# Patient Record
Sex: FEMALE | Race: WHITE
[De-identification: names, ages, dates, MRNs, and addresses within clinical notes are randomized per-mention and may not be internally consistent; named-entity substitution may affect disease eponyms.]

---

## 2017-01-02 ENCOUNTER — HOSPITAL ENCOUNTER (EMERGENCY)
Dept: HOSPITAL 74 - JER | Age: 77
Discharge: HOME | End: 2017-01-02
Payer: COMMERCIAL

## 2017-01-02 VITALS — HEART RATE: 81 BPM | TEMPERATURE: 98.3 F | SYSTOLIC BLOOD PRESSURE: 149 MMHG | DIASTOLIC BLOOD PRESSURE: 75 MMHG

## 2017-01-02 VITALS — BODY MASS INDEX: 23 KG/M2

## 2017-01-02 DIAGNOSIS — I10: ICD-10-CM

## 2017-01-02 DIAGNOSIS — Z76.0: ICD-10-CM

## 2017-01-02 DIAGNOSIS — E03.9: ICD-10-CM

## 2017-01-02 DIAGNOSIS — E78.00: ICD-10-CM

## 2017-01-02 DIAGNOSIS — Z79.4: ICD-10-CM

## 2017-01-02 DIAGNOSIS — E11.65: Primary | ICD-10-CM

## 2017-01-02 LAB
ACETONE SERPL-MCNC: NEGATIVE MG/DL
ALBUMIN SERPL-MCNC: 3.6 G/DL (ref 3.4–5)
ALP SERPL-CCNC: 109 U/L (ref 45–117)
ALT SERPL-CCNC: 31 U/L (ref 12–78)
ANION GAP SERPL CALC-SCNC: 11 MMOL/L (ref 8–16)
APPEARANCE UR: (no result)
AST SERPL-CCNC: 21 U/L (ref 15–37)
BASOPHILS # BLD: 0.7 % (ref 0–2)
BILIRUB SERPL-MCNC: 0.4 MG/DL (ref 0.2–1)
BILIRUB UR STRIP.AUTO-MCNC: NEGATIVE MG/DL
CALCIUM SERPL-MCNC: 9.2 MG/DL (ref 8.5–10.1)
CO2 SERPL-SCNC: 25 MMOL/L (ref 21–32)
COLOR UR: (no result)
CREAT SERPL-MCNC: 0.8 MG/DL (ref 0.55–1.02)
DEPRECATED RDW RBC AUTO: 13 % (ref 11.6–15.6)
EOSINOPHIL # BLD: 0.1 % (ref 0–4.5)
GLUCOSE SERPL-MCNC: 100 MG/DL (ref 74–106)
KETONES UR QL STRIP: NEGATIVE
LEUKOCYTE ESTERASE UR QL STRIP.AUTO: NEGATIVE
MCH RBC QN AUTO: 31 PG (ref 25.7–33.7)
MCHC RBC AUTO-ENTMCNC: 33.3 G/DL (ref 32–36)
MCV RBC: 93 FL (ref 80–96)
NEUTROPHILS # BLD: 83.4 % (ref 42.8–82.8)
NITRITE UR QL STRIP: NEGATIVE
PH UR: 6 [PH] (ref 5–8)
PLATELET # BLD AUTO: 221 K/MM3 (ref 134–434)
PMV BLD: 8.8 FL (ref 7.5–11.1)
PROT SERPL-MCNC: 7.1 G/DL (ref 6.4–8.2)
PROT UR QL STRIP: NEGATIVE
PROT UR QL STRIP: NEGATIVE
RBC # UR STRIP: NEGATIVE /UL
SP GR UR: 1.01 (ref 1–1.03)
UROBILINOGEN UR STRIP-MCNC: NEGATIVE E.U./DL (ref 0.2–1)
WBC # BLD AUTO: 12 K/MM3 (ref 4–10)

## 2017-01-02 NOTE — PDOC
History of Present Illness





<Ro Vilchis - Last Filed: 01/02/17 15:32>





- General


History Source: Patient


Exam Limitations: No Limitations





- History of Present Illness


Initial Comments: 


01/02/17 10:43


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 

significant past medical history of HTN, HLD and diabetes, who presents to the 

emergency department with lightheadedness and elevated blood sugar. She notes 

that she ran out of her Levemir medication and attempted to get in contact with 

her PMD, who are closed due to the holidays. She states that her blood sugar 

levels have been up and down prior to running out of her Levemir medication. 





The patient denies chest pain, shortness of breath, headache and dizziness. 

Denies fever, chills, nausea, vomit, diarrhea and constipation. Denies dysuria, 

frequency, urgency and hematuria. 





Allergies: None 


Past surgical history: L. Foot, L. 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 

1995


Social history: No Alcohol, tobacco or drug use reported 








<Reyes Dubose - Last Filed: 01/02/17 15:50>





- General


Chief Complaint: Lightheaded


Stated Complaint: LIGHTHEADED, HIGH SUGAR


Time Seen by Provider: 01/02/17 09:38





Past History





- Past Medical History


Anemia: No


Asthma: No


Cancer: No


Cardiac Disorders: No


CVA: No


COPD: No


CHF: No


Dementia: No


Diabetes: Yes


GI Disorders: Yes (BLOOD IN STOOL)


 Disorders: No


HTN: Yes


Hypercholesterolemia: Yes


Liver Disease: No


Suicide Attempt (Hx): No


Seizures: No


Thyroid Disease: Yes





- Surgical History


Abdominal Surgery: No


Appendectomy: No


Cardiac Surgery: No


Cholecystectomy: No


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


Orthopedic Surgery: Yes (L. Foot, L. Knee replaced)





- Psycho/Social/Smoking Cessation Hx


Anxiety: No


Suicidal Ideation: No


Smoking Status: No


Smoking History: Never smoked


Have you smoked in the past 12 months: No


Number of Cigarettes Smoked Daily: 0


Information on smoking cessation initiated: No


Hx Alcohol Use: No


Drug/Substance Use Hx: No


Substance Use Type: None


Hx Substance Use Treatment: No





<Ro Vilchis - Last Filed: 01/02/17 15:32>





<Reyes Dubose - Last Filed: 01/02/17 15:50>





- Past Medical History


Allergies/Adverse Reactions: 


 Allergies











Allergy/AdvReac Type Severity Reaction Status Date / Time


 


No Known Allergies Allergy   Verified 01/02/17 09:31











Home Medications: 


Ambulatory Orders





Metformin HCl [Glucophage] 1,000 mg PO DAILY 06/30/13 


Pravastatin Sodium [Pravachol -] 80 mg PO HS 06/30/13 


Aspirin [ASA -] 81 mg PO DAILY #0 tab.chew 07/05/13 


Levothyroxine [Synthroid -] 112 mcg PO DAILY@0700 #0 tablet 07/05/13 


Multivitamins [Multivit ([facility] Formul[facility])] 1 udtab PO DAILY #0 tab 07/05/13 


Glimepiride [Amaryl] 2 mg PO BID 09/17/13 


Insulin Aspart [Novolog] 6 unit SQ BID 09/17/13 


Insulin Detemir [Levemir] 6 unit SQ HS 09/17/13 


Losartan Potassium [Cozaar -] 100 mg PO DAILY 09/17/13 


Ranitidine [Zantac -] 150 mg PO BID #120 tablet 09/18/13 


Amlodipine Besylate [Norvasc -] 5 mg PO DAILY 06/13/15 


Sodium Chloride 5% Ophth Soln [Scotty-128 (Nf)] 1 drop OU DAILY 06/13/15 


Insulin Detemir [Levemir Flextouch] 16 unit SQ DAILY #1 ml 01/02/17 











**Review of Systems





- Review of Systems


Able to Perform ROS?: Yes


Comments:: 





01/02/17 10:42


GENERAL/CONSTITUTIONAL: No fever or chills. No weakness.


HEAD, EYES, EARS, NOSE AND THROAT: No change in vision. No ear pain or 

discharge. No sore throat.


CARDIOVASCULAR: +Lightheadedness. No chest pain or shortness of breath


RESPIRATORY: No cough, wheezing, or hemoptysis.


GASTROINTESTINAL: No nausea, vomiting, diarrhea or constipation.


GENITOURINARY: No dysuria, frequency, or change in urination.


MUSCULOSKELETAL: No joint or muscle swelling or pain. No neck or back pain.


SKIN: No rash


NEUROLOGIC: No headache, vertigo, loss of consciousness, or change in strength/

sensation.


ENDOCRINE: No increased thirst. No abnormal weight change


HEMATOLOGIC/LYMPHATIC: No anemia, easy bleeding, or history of blood clots.


ALLERGIC/IMMUNOLOGIC: No hives or skin allergy.





<ConchaCourtney austinadolph Chahal - Last Filed: 01/02/17 15:50>





*Physical Exam





- Vital Signs


 Last Vital Signs











Temp Pulse Resp BP Pulse Ox


 


 98.3 F   81   18   149/75   100 


 


 01/02/17 09:27  01/02/17 09:27  01/02/17 09:27  01/02/17 09:27  01/02/17 09:27














<Ro Vilchis - Last Filed: 01/02/17 15:32>





- Vital Signs


 Last Vital Signs











Temp Pulse Resp BP Pulse Ox


 


 98.3 F   81   18   149/75   100 


 


 01/02/17 09:27  01/02/17 09:27  01/02/17 09:27  01/02/17 09:27  01/02/17 09:27














- Physical Exam


Comments: 





01/02/17 10:42


GENERAL: Awake, alert, and fully oriented, in no acute distress


HEAD: No signs of trauma, normocephalic, atraumatic 


EYES: PERRLA, EOMI, sclera anicteric, conjunctiva clear


ENT: Auricles normal inspection, hearing grossly normal, nares patent, 

oropharynx clear without


exudates. Moist mucosa


NECK: Normal ROM, supple, no lymphadenopathy, JVD, or masses


LUNGS: No distress, speaks full sentences, clear to auscultation bilaterally 


HEART: Regular rate and rhythm, normal S1 and S2, no murmurs, rubs or gallops, 

peripheral pulses normal and equal bilaterally. 


ABDOMEN: Soft, nontender, normoactive bowel sounds.  No guarding, no rebound.  

No masses


EXTREMITIES: Normal inspection, Normal range of motion, no edema.  No clubbing 

or cyanosis. 


NEUROLOGICAL: Cranial nerves II through XII grossly intact.  Normal speech, 

normal gait, no focal sensorimotor deficits 


SKIN: Warm, Dry, normal turgor, no rashes or lesions noted. 








<HarpalgladysReyes - Last Filed: 01/02/17 15:50>





ED Treatment Course





- LABORATORY


CBC & Chemistry Diagram: 


 01/02/17 10:14





 01/02/17 10:14





<Ro Vilchis - Last Filed: 01/02/17 15:32>





- LABORATORY


CBC & Chemistry Diagram: 


 01/02/17 10:14





 01/02/17 10:14





- ADDITIONAL ORDERS


Additional order review: 


 Laboratory  Results











  01/02/17





  10:14


 


Acetone, Qual  Negative L








 











  01/02/17





  10:14


 


RBC  4.37


 


MCV  93.0


 


MCHC  33.3


 


RDW  13.0  D


 


MPV  8.8


 


Neutrophils %  83.4 H


 


Lymphocytes %  8.2  D


 


Monocytes %  7.6


 


Eosinophils %  0.1  D


 


Basophils %  0.7














<Reyes Dubose - Last Filed: 01/02/17 15:50>





Medical Decision Making





- Medical Decision Making


Case d/w Dr. Bagley, patient's endocrinologist- her noon blood sugar values 

have been elevated in 200-300 range. Currently 100 on CMP. I will refill her 

levemir. He requests that she follow up tomorrow or Wednesday in his office to 

reevaluate her medication. Pt is amenable to this plan. 








<Ro Vilchis - Last Filed: 01/02/17 15:32>





*DC/Admit/Observation/Transfer





- Discharge Dispostion


Admit: No





<Ro Vilchis - Last Filed: 01/02/17 15:32>





- Attestations


Scribe Attestion: 





01/02/17 10:43


Documentation prepared by Reyes Dubose, acting as medical scribe for 

Ro Vilchis MD.





<Reyes Dubose - Last Filed: 01/02/17 15:50>


Diagnosis at time of Disposition: 


 Medication refill, Hyperglycemia





- Discharge Dispostion


Disposition: HOME


Condition at time of disposition: Stable





- Prescriptions


Prescriptions: 


Insulin Detemir [Levemir Flextouch] 16 unit SQ DAILY #1 ml

## 2018-11-02 ENCOUNTER — HOSPITAL ENCOUNTER (INPATIENT)
Dept: HOSPITAL 74 - FER | Age: 78
LOS: 7 days | Discharge: HOME | DRG: 393 | End: 2018-11-09
Attending: NURSE PRACTITIONER | Admitting: HOSPITALIST
Payer: COMMERCIAL

## 2018-11-02 VITALS — BODY MASS INDEX: 25 KG/M2

## 2018-11-02 DIAGNOSIS — E78.5: ICD-10-CM

## 2018-11-02 DIAGNOSIS — I88.0: Primary | ICD-10-CM

## 2018-11-02 DIAGNOSIS — E87.1: ICD-10-CM

## 2018-11-02 DIAGNOSIS — K65.1: ICD-10-CM

## 2018-11-02 DIAGNOSIS — N83.201: ICD-10-CM

## 2018-11-02 DIAGNOSIS — E03.9: ICD-10-CM

## 2018-11-02 DIAGNOSIS — I10: ICD-10-CM

## 2018-11-02 DIAGNOSIS — Z79.4: ICD-10-CM

## 2018-11-02 DIAGNOSIS — Z79.84: ICD-10-CM

## 2018-11-02 DIAGNOSIS — K29.70: ICD-10-CM

## 2018-11-02 DIAGNOSIS — R19.7: ICD-10-CM

## 2018-11-02 DIAGNOSIS — K86.89: ICD-10-CM

## 2018-11-02 DIAGNOSIS — B96.29: ICD-10-CM

## 2018-11-02 DIAGNOSIS — K57.30: ICD-10-CM

## 2018-11-02 DIAGNOSIS — E11.9: ICD-10-CM

## 2018-11-02 LAB
ALBUMIN SERPL-MCNC: 2.8 G/DL (ref 3.5–5)
ALP SERPL-CCNC: 88 U/L (ref 32–92)
ALT SERPL-CCNC: 20 U/L (ref 10–40)
ANION GAP SERPL CALC-SCNC: 7 MMOL/L (ref 8–16)
AST SERPL-CCNC: 24 U/L (ref 10–42)
BACTERIA #/AREA URNS HPF: (no result) /HPF
BASOPHILS # BLD: 1.5 % (ref 0–2)
BILIRUB SERPL-MCNC: 0.5 MG/DL (ref 0.2–1)
BUN SERPL-MCNC: 13 MG/DL (ref 7–18)
CALCIUM SERPL-MCNC: 8.5 MG/DL (ref 8.4–10.2)
CHLORIDE SERPL-SCNC: 95 MMOL/L (ref 98–107)
CO2 SERPL-SCNC: 25 MMOL/L (ref 22–28)
CREAT SERPL-MCNC: 0.8 MG/DL (ref 0.6–1.3)
DEPRECATED RDW RBC AUTO: 11.5 % (ref 11.6–15.6)
EOSINOPHIL # BLD: 0.9 % (ref 0–4.5)
GLUCOSE SERPL-MCNC: 226 MG/DL (ref 74–106)
HCT VFR BLD CALC: 36.2 % (ref 32.4–45.2)
HGB BLD-MCNC: 12.1 GM/DL (ref 10.7–15.3)
LYMPHOCYTES # BLD: 7.5 % (ref 8–40)
MCH RBC QN AUTO: 30.9 PG (ref 25.7–33.7)
MCHC RBC AUTO-ENTMCNC: 33.5 G/DL (ref 32–36)
MCV RBC: 92.4 FL (ref 80–96)
MONOCYTES # BLD AUTO: 9.5 % (ref 3.8–10.2)
NEUTROPHILS # BLD: 80.6 % (ref 42.8–82.8)
PH UR: 6.5 [PH] (ref 4.5–8)
PLATELET # BLD AUTO: 238 K/MM3 (ref 134–434)
PMV BLD: 8.9 FL (ref 7.5–11.1)
POTASSIUM SERPLBLD-SCNC: 4.4 MMOL/L (ref 3.5–5.1)
PROT SERPL-MCNC: 5.6 G/DL (ref 6.4–8.3)
PROT UR QL STRIP: (no result)
PROT UR QL STRIP: (no result)
RBC # BLD AUTO: (no result) /HPF (ref 0–3)
RBC # BLD AUTO: 3.91 M/MM3 (ref 3.6–5.2)
SODIUM SERPL-SCNC: 127 MMOL/L (ref 136–145)
SP GR UR: 1.01 (ref 1.01–1.03)
UROBILINOGEN UR STRIP-MCNC: 0.2 MG/DL (ref 0.2–1)
WBC # BLD AUTO: 12.5 K/MM3 (ref 4–10.8)
WBC # UR AUTO: (no result) /UL (ref 0–5)

## 2018-11-02 NOTE — PDOC
History of Present Illness





- General


Chief Complaint: Pain


Stated Complaint: SENT BY PMD FOR EVALUATION ABDOMINAL PAIN


Time Seen by Provider: 11/02/18 15:56


History Source: Patient


Exam Limitations: No Limitations





- History of Present Illness


Initial Comments: 





11/02/18 16:33


76 yo F HTN HLD hypothyroid and DM here wtih c/o left lower quadrant abd pain. n

/v x 2 7 days ago, and  loose stool.   diffuse lower abd pain. no mod factors.  

has been eating and drinking less than usual. no urinary complaints. no f/c no h

/o diverticulitis. saw dr moreno, sent to ed for ct a/p





Past History





- Past Medical History


Allergies/Adverse Reactions: 


 Allergies











Allergy/AdvReac Type Severity Reaction Status Date / Time


 


No Known Allergies Allergy   Verified 11/02/18 15:33











Home Medications: 


Ambulatory Orders





Pravastatin Sodium [Pravachol -] 80 mg PO HS 06/30/13 


metFORMIN HCL [Glucophage] 1,000 mg PO DAILY 06/30/13 


Aspirin [ASA -] 81 mg PO DAILY #0 tab.chew 07/05/13 


Levothyroxine [Synthroid -] 112 mcg PO DAILY@0700 #0 tablet 07/05/13 


Losartan Potassium [Cozaar -] 100 mg PO DAILY 09/17/13 


Amlodipine Besylate [Norvasc -] 5 mg PO DAILY 06/13/15 


Insulin Detemir [Levemir Flextouch] 16 unit SQ DAILY #1 ml 01/02/17 


Insulin Lispro [Humalog] 5 unit SQ TID 11/02/18 


Metformin HCl [Metformin HCl ER] 500 mg PO DAILY 11/02/18 


Propylene Glycol/Peg 400/Pf [Systane 0.3-0.4% Eye Drop] 1 each OU TID 11/04/18 


Sitagliptin Phosphate [Januvia] 100 mg PO DAILY 11/04/18 


Amoxicillin/Potassium Clav [Augmentin 875-125 Tablet] 1 each PO BID #20 tablet 

11/09/18 


Docusate Sodium [Colace -] 100 mg PO TID #90 capsule 11/09/18 


Lactobacillus Acidophilus [Bacid -] 1 tab PO DAILY #30 tab 11/09/18 


Melatonin 5 mg PO HS PRN #30 tab 11/09/18 


Polyethylene Glycol 3350 [Miralax 119 gm Btl -] 17 gm PO BID #1 bottle 11/09/18 








Anemia: No


Asthma: No


Cancer: No


Cardiac Disorders: No


CVA: No


COPD: No


CHF: No


Dementia: No


Diabetes: Yes


GI Disorders: Yes (BLOOD IN STOOL)


 Disorders: No


HTN: Yes


Hypercholesterolemia: Yes


Liver Disease: No


Seizures: No


Thyroid Disease: Yes





- Surgical History


Abdominal Surgery: No


Appendectomy: No


Cardiac Surgery: No


Cholecystectomy: No


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


Orthopedic Surgery: Yes (L. Foot, L. Knee replaced)





- Suicide/Smoking/Psychosocial Hx


Smoking Status: No


Smoking History: Never smoked


Have you smoked in the past 12 months: No


Number of Cigarettes Smoked Daily: 0


Information on smoking cessation initiated: No


Hx Alcohol Use: No


Drug/Substance Use Hx: No


Substance Use Type: None


Hx Substance Use Treatment: No





**Review of Systems





- Review of Systems


Constitutional: No: Chills, Diaphoresis, Fever


HEENTM: No: Blurred Vision


Respiratory: No: Cough, Orthopnea


Cardiac (ROS): No: Chest Pain, Edema


ABD/GI: Yes: Constipated, Diarrhea


: No: Burning, Dysuria, Discharge


Musculoskeletal: No: Back Pain


All Other Systems: Reviewed and Negative





*Physical Exam





- Vital Signs


 Last Vital Signs











Temp Pulse Resp BP Pulse Ox


 


 98.1 F   76   16   149/78   100 


 


 11/02/18 15:32  11/02/18 15:32  11/02/18 15:32  11/02/18 15:32  11/02/18 15:32














- Physical Exam


Comments: 





11/02/18 16:35


awake alert lungs clear bilaterally  heart rr  no mrg abd soft llq ttp. no 

rebound no guarding. ext wwp. no edema. 





ED Treatment Course





- LABORATORY


CBC & Chemistry Diagram: 


 11/08/18 07:37





 11/08/18 07:37





Medical Decision Making





- Medical Decision Making





11/02/18 16:38


differential diverticulitis, uti, renal, colitis. plan ct a/p labs ua 





*DC/Admit/Observation/Transfer


Diagnosis at time of Disposition: 


 Small bowel edema





Diarrhea


Qualifiers:


 Diarrhea type: unspecified type Qualified Code(s): R19.7 - Diarrhea, 

unspecified








- Discharge Dispostion


Condition at time of disposition: Stable





- Prescriptions





- Referrals





- Patient Instructions





- Post Discharge Activity

## 2018-11-02 NOTE — PDOC
History of Present Illness





- General


Chief Complaint: Pain


Stated Complaint: SENT BY PMD FOR EVALUATION ABDOMINAL PAIN


Time Seen by Provider: 11/02/18 15:56





Past History





- Past Medical History


Allergies/Adverse Reactions: 


 Allergies











Allergy/AdvReac Type Severity Reaction Status Date / Time


 


No Known Allergies Allergy   Verified 11/02/18 15:33











Home Medications: 


Ambulatory Orders





Pravastatin Sodium [Pravachol -] 80 mg PO HS 06/30/13 


metFORMIN HCL [Glucophage] 1,000 mg PO DAILY 06/30/13 


Aspirin [ASA -] 81 mg PO DAILY #0 tab.chew 07/05/13 


Levothyroxine [Synthroid -] 112 mcg PO DAILY@0700 #0 tablet 07/05/13 


Glimepiride [Amaryl] 2 mg PO BID 09/17/13 


Losartan Potassium [Cozaar -] 100 mg PO DAILY 09/17/13 


Amlodipine Besylate [Norvasc -] 5 mg PO DAILY 06/13/15 


Insulin Detemir [Levemir Flextouch] 16 unit SQ DAILY #1 ml 01/02/17 


Insulin Lispro [Humalog] 5 unit SQ TID 11/02/18 


Metformin HCl [Metformin HCl ER] 500 mg PO DAILY 11/02/18 








Anemia: No


Asthma: No


Cancer: No


Cardiac Disorders: No


CVA: No


COPD: No


CHF: No


Dementia: No


Diabetes: Yes


GI Disorders: Yes (BLOOD IN STOOL)


 Disorders: No


HTN: Yes


Hypercholesterolemia: Yes


Liver Disease: No


Seizures: No


Thyroid Disease: Yes





- Surgical History


Abdominal Surgery: No


Appendectomy: No


Cardiac Surgery: No


Cholecystectomy: No


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


Orthopedic Surgery: Yes (L. Foot, L. Knee replaced)





- Suicide/Smoking/Psychosocial Hx


Smoking Status: No


Smoking History: Never smoked


Have you smoked in the past 12 months: No


Number of Cigarettes Smoked Daily: 0


Hx Alcohol Use: No


Drug/Substance Use Hx: No


Substance Use Type: None


Hx Substance Use Treatment: No





ED Treatment Course





- RADIOLOGY


Radiology Studies Ordered: 














 Category Date Time Status


 


 ABDOMEN CT WITH CONTRAST* [CT] Stat CT Scan  11/02/18 15:56 Ordered














*DC/Admit/Observation/Transfer





- Discharge Dispostion


Condition at time of disposition: Stable





- Referrals





- Patient Instructions





- Post Discharge Activity

## 2018-11-02 NOTE — PDOC
*Physical Exam





- Vital Signs


 Last Vital Signs











Temp Pulse Resp BP Pulse Ox


 


 98.1 F   76   16   149/78   100 


 


 11/02/18 15:32  11/02/18 15:32  11/02/18 15:32  11/02/18 15:32  11/02/18 15:32














ED Treatment Course





- LABORATORY


CBC & Chemistry Diagram: 


 11/02/18 16:50





 11/02/18 16:50





- ADDITIONAL ORDERS


Additional order review: 


 Laboratory  Results











  11/02/18 11/02/18 11/02/18





  16:50 16:50 16:50


 


Sodium    127 L


 


Potassium    4.4


 


Chloride    95 L


 


Carbon Dioxide    25


 


Anion Gap    7 L


 


BUN    13


 


Creatinine    0.8


 


Creat Clearance w eGFR    > 60


 


Random Glucose    226 H


 


Lactic Acid  1.2  


 


Calcium    8.5


 


Total Bilirubin    0.5


 


AST    24


 


ALT    20


 


Alkaline Phosphatase    88


 


Total Protein    5.6 L


 


Albumin    2.8 L


 


Lipase   217 


 


Urine Color   


 


Urine Appearance   


 


Urine pH   


 


Ur Specific Gravity   


 


Urine Protein   


 


Urine Glucose (UA)   


 


Urine Ketones   


 


Urine Blood   


 


Urine Nitrite   


 


Urine Bilirubin   


 


Urine Urobilinogen   


 


Ur Leukocyte Esterase   


 


Urine RBC   


 


Urine WBC   


 


Urine Bacteria   














  11/02/18





  16:10


 


Sodium 


 


Potassium 


 


Chloride 


 


Carbon Dioxide 


 


Anion Gap 


 


BUN 


 


Creatinine 


 


Creat Clearance w eGFR 


 


Random Glucose 


 


Lactic Acid 


 


Calcium 


 


Total Bilirubin 


 


AST 


 


ALT 


 


Alkaline Phosphatase 


 


Total Protein 


 


Albumin 


 


Lipase 


 


Urine Color  Yellow


 


Urine Appearance  Clear


 


Urine pH  6.5


 


Ur Specific Gravity  1.015


 


Urine Protein  1+ H


 


Urine Glucose (UA)  1+ H


 


Urine Ketones  Negative


 


Urine Blood  Negative


 


Urine Nitrite  Negative


 


Urine Bilirubin  Negative


 


Urine Urobilinogen  0.2


 


Ur Leukocyte Esterase  Negative


 


Urine RBC  0-2


 


Urine WBC  0-1


 


Urine Bacteria  4+








 











  11/02/18





  16:50


 


RBC  3.91


 


MCV  92.4


 


MCHC  33.5


 


RDW  11.5 L


 


MPV  8.9


 


Neutrophils %  80.6


 


Lymphocytes %  7.5 L


 


Monocytes %  9.5


 


Eosinophils %  0.9


 


Basophils %  1.5














Medical Decision Making





- Medical Decision Making





11/02/18 20:56





Asked to follow-up CAT scan results small amount of bowel edema questionable 

small abscess nonamenable to IR drainage given size





Given abdominal discomfort with diarrhea we'll treat with Cipro Flagyl will 

admit hospital for further management IV fluids have been given.








*DC/Admit/Observation/Transfer


Diagnosis at time of Disposition: 


 Small bowel edema





Diarrhea


Qualifiers:


 Diarrhea type: unspecified type Qualified Code(s): R19.7 - Diarrhea, 

unspecified








- Discharge Dispostion


Condition at time of disposition: Stable


Decision to Admit order: Yes


Decision to Admit order Date/Time: 


Decision to Admit Order











 Category Date Time Status


 


 Decision to Admit to Hospital Routine Admission  11/02/18 20:14 Active














- Referrals





- Patient Instructions





- Post Discharge Activity

## 2018-11-03 LAB
ALBUMIN SERPL-MCNC: 2.4 G/DL (ref 3.4–5)
ALP SERPL-CCNC: 89 U/L (ref 45–117)
ALT SERPL-CCNC: 20 U/L (ref 13–61)
ANION GAP SERPL CALC-SCNC: 13 MMOL/L (ref 8–16)
AST SERPL-CCNC: 13 U/L (ref 15–37)
BASOPHILS # BLD: 0 % (ref 0–2)
BILIRUB SERPL-MCNC: 0.5 MG/DL (ref 0.2–1)
BUN SERPL-MCNC: 9 MG/DL (ref 7–18)
CALCIUM SERPL-MCNC: 7.8 MG/DL (ref 8.5–10.1)
CHLORIDE SERPL-SCNC: 97 MMOL/L (ref 98–107)
CO2 SERPL-SCNC: 22 MMOL/L (ref 21–32)
CREAT SERPL-MCNC: 0.7 MG/DL (ref 0.55–1.3)
DEPRECATED RDW RBC AUTO: 11.6 % (ref 11.6–15.6)
EOSINOPHIL # BLD: 0.7 % (ref 0–4.5)
GLUCOSE SERPL-MCNC: 213 MG/DL (ref 74–106)
HCT VFR BLD CALC: 34.2 % (ref 32.4–45.2)
HGB BLD-MCNC: 11.7 GM/DL (ref 10.7–15.3)
LYMPHOCYTES # BLD: 7.6 % (ref 8–40)
MAGNESIUM SERPL-MCNC: 1.8 MG/DL (ref 1.8–2.4)
MCH RBC QN AUTO: 31.6 PG (ref 25.7–33.7)
MCHC RBC AUTO-ENTMCNC: 34.1 G/DL (ref 32–36)
MCV RBC: 92.6 FL (ref 80–96)
MONOCYTES # BLD AUTO: 9.6 % (ref 3.8–10.2)
NEUTROPHILS # BLD: 82.1 % (ref 42.8–82.8)
PHOSPHATE SERPL-MCNC: 2.6 MG/DL (ref 2.5–4.9)
PLATELET # BLD AUTO: 215 K/MM3 (ref 134–434)
PMV BLD: 8.7 FL (ref 7.5–11.1)
POTASSIUM SERPLBLD-SCNC: 4.7 MMOL/L (ref 3.5–5.1)
PROT SERPL-MCNC: 5.3 G/DL (ref 6.4–8.2)
RBC # BLD AUTO: 3.7 M/MM3 (ref 3.6–5.2)
SODIUM SERPL-SCNC: 132 MMOL/L (ref 136–145)
WBC # BLD AUTO: 9.8 K/MM3 (ref 4–10.8)

## 2018-11-03 RX ADMIN — INSULIN ASPART SCH UNITS: 100 INJECTION, SOLUTION INTRAVENOUS; SUBCUTANEOUS at 22:30

## 2018-11-03 RX ADMIN — PIPERACILLIN SODIUM,TAZOBACTAM SODIUM SCH MLS/HR: 3; .375 INJECTION, POWDER, FOR SOLUTION INTRAVENOUS at 18:13

## 2018-11-03 RX ADMIN — PIPERACILLIN SODIUM,TAZOBACTAM SODIUM SCH MLS/HR: 3; .375 INJECTION, POWDER, FOR SOLUTION INTRAVENOUS at 15:00

## 2018-11-03 RX ADMIN — INSULIN ASPART SCH: 100 INJECTION, SOLUTION INTRAVENOUS; SUBCUTANEOUS at 18:13

## 2018-11-03 RX ADMIN — INSULIN DETEMIR SCH UNITS: 100 INJECTION, SOLUTION SUBCUTANEOUS at 22:30

## 2018-11-03 RX ADMIN — AMLODIPINE BESYLATE SCH MG: 5 TABLET ORAL at 15:10

## 2018-11-03 NOTE — CON.ID
Consult


Consult Specialty:: infectious disease


Referred by:: hospitalist


Reason for Consultation:: possible LLQ abscess





- History of Present Illness


Chief Complaint: LLQ pain for one week


History of Present Illness: 





last friday she had vomiting and LLQ pain, pain has persisted, eating poorly


no fevers


saw her PMD yesterday and had ct scan with question of LLQ 3 cm abscess- 





no history of abdominal surgery


no history of diverticulitis


GI - dr lozano











- History Source


History Provided By: Patient


Limitations to Obtaining History: No Limitations





- Past Medical History


...Pregnant: No


Endocrine: Yes: Diabetes Mellitus, Hypothyroidism





- Past Surgical History


Additional Surgical History: left TKR, Right shoulder replacement.  left foot 

surgery





- Alcohol/Substance Use


Hx Alcohol Use: No





- Smoking History


Smoking history: Never smoked


Have you smoked in the past 12 months: No


Aproximately how many cigarettes per day: 0





- Social History


Usual Living Arrangement: Other


ADL: Independent


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


Place of Birth: [place]


History of Recent Travel: No





Home Medications





- Allergies


Allergies/Adverse Reactions: 


 Allergies











Allergy/AdvReac Type Severity Reaction Status Date / Time


 


No Known Allergies Allergy   Verified 11/02/18 15:33














- Home Medications


Home Medications: 


Ambulatory Orders





Pravastatin Sodium [Pravachol -] 80 mg PO HS 06/30/13 


metFORMIN HCL [Glucophage] 1,000 mg PO DAILY 06/30/13 


Aspirin [ASA -] 81 mg PO DAILY #0 tab.chew 07/05/13 


Levothyroxine [Synthroid -] 112 mcg PO DAILY@0700 #0 tablet 07/05/13 


Glimepiride [Amaryl] 2 mg PO BID 09/17/13 


Losartan Potassium [Cozaar -] 100 mg PO DAILY 09/17/13 


Amlodipine Besylate [Norvasc -] 5 mg PO DAILY 06/13/15 


Insulin Detemir [Levemir Flextouch] 16 unit SQ DAILY #1 ml 01/02/17 


Insulin Lispro [Humalog] 5 unit SQ TID 11/02/18 


Metformin HCl [Metformin HCl ER] 500 mg PO DAILY 11/02/18 











Family Disease History





- Family Disease History


Family History: Denies





Review of Systems





- Review of Systems


Constitutional: reports: Loss of Appetite.  denies: Fever, Lethargy


Eyes: reports: No Symptoms


HENT: reports: No Symptoms


Neck: reports: No Symptoms


Cardiovascular: reports: No Symptoms


Respiratory: reports: No Symptoms


Gastrointestinal: reports: Abdominal Pain, Vomiting


Genitourinary: reports: No Symptoms





Physical Exam


Vital Signs: 


 Vital Signs











Temperature  99.0 F   11/03/18 14:00


 


Pulse Rate  60   11/03/18 14:00


 


Respiratory Rate  17   11/03/18 14:00


 


Blood Pressure  137/61   11/03/18 14:00


 


O2 Sat by Pulse Oximetry (%)  100   11/03/18 14:00











Constitutional: Yes: No Distress, Calm


Eyes: Yes: Conjunctiva Clear


HENT: Yes: Atraumatic, Normocephalic


Neck: Yes: Supple


Cardiovascular: Yes: Regular Rate and Rhythm


Respiratory: Yes: Regular, CTA Bilaterally


Gastrointestinal: Yes: Normal Bowel Sounds, Soft, Tenderness (LLQ)


...Rectal Exam: Yes: Deferred


Extremities: Yes: WNL


Edema: No


Psychiatric: Yes: Alert, Oriented


Labs: 


 CBC, BMP





 11/03/18 07:00 





 11/03/18 07:00 











Imaging





- Results


Cat Scan: Report Reviewed





Problem List





- Problems


(1) Intra-abdominal abscess


Code(s): K65.1 - PERITONEAL ABSCESS   





(2) Diabetes


Code(s): E11.9 - TYPE 2 DIABETES MELLITUS WITHOUT COMPLICATIONS   





Assessment/Plan





continue zosyn


?IR drainage


esr/crp





further reccd to follow





d/w hospitalist

## 2018-11-03 NOTE — HP
CHIEF COMPLAINT: Left lower quadrant pain





PCP: Dr. Blair


GI: Dr. Griffin





HISTORY OF PRESENT ILLNESS:


77 year-old female with a PMH significant for HTN, HLD, Type II diabetes 

insulin dependent, hypothyroidism, gastritis, and sigmoid diverticulosis. A 

week ago patient developed pain in the LLQ, nausea, and vomiting. The nausea 

and vomiting resolved the next day, but the LLQ pain persisted. The pain was 

not better or worse with food, but PO intake dropped due to discomfort. 

Yesterday patient went to see her PCP Dr. Blair who referred patient to the 

ED. Patient denies fever, sweats, chills. Denies dysuria, frequency, urgency. 

Denies diarrhea. No history of abdominal surgeries.





Recent Travel:


No





PAST MEDICAL HISTORY:


Hypertension


Hyperlipidemia


Type II diabetes insulin dependent


Hypothyroidism


Gastritis


Sigmoid diverticulosis





PAST SURGICAL HISTORY:


Brain mass resection (, benign)


Left knee replacement ()


Left foot arch reconstruction ()


Right shoulder replacement ()





Social History:


Smoking: no


Alcohol: no


Drugs: no





Family History: mother  8 days after patient's birth from heart problem; 

father killed in accident, no biological siblings





Allergies


No Known Allergies Allergy (Verified 18 15:33)


 








HOME MEDICATIONS:


 Home Medications











 Medication  Instructions  Recorded


 


Pravastatin Sodium [Pravachol -] 80 mg PO HS 13


 


metFORMIN HCL [Glucophage] 1,000 mg PO DAILY 13


 


Aspirin [ASA -] 81 mg PO DAILY #0 tab.chew 13


 


Levothyroxine [Synthroid -] 112 mcg PO DAILY@0700 #0 tablet 13


 


Glimepiride [Amaryl] 2 mg PO BID 13


 


Losartan Potassium [Cozaar -] 100 mg PO DAILY 13


 


Amlodipine Besylate [Norvasc -] 5 mg PO DAILY 06/13/15


 


Insulin Detemir [Levemir Flextouch] 16 unit SQ DAILY #1 ml 17


 


Insulin Lispro [Humalog] 5 unit SQ TID 18


 


Metformin HCl [Metformin HCl ER] 500 mg PO DAILY 18








REVIEW OF SYSTEMS


CONSTITUTIONAL: 


+loss of appetite


Absent:  fever, chills, diaphoresis, generalized weakness, malaise, weight 

change


HEENT: 


Absent:  rhinorrhea, nasal congestion, throat pain, throat swelling, difficulty 

swallowing, mouth swelling, ear pain, eye pain, visual changes


CARDIOVASCULAR: 


Absent: chest pain, syncope, palpitations, irregular heart rate, lightheadedness

, peripheral edema


RESPIRATORY: 


Absent: cough, shortness of breath, dyspnea with exertion, orthopnea, wheezing, 

stridor, hemoptysis


GASTROINTESTINAL:


+LLQ pain, nausea, vomiting


Absent: abdominal distension, diarrhea, constipation, melena, hematochezia


GENITOURINARY: 


Absent: dysuria, frequency, urgency, hesitancy, hematuria, flank pain, genital 

pain


MUSCULOSKELETAL: 


Absent: myalgia, arthralgia, joint swelling, back pain, neck pain


SKIN: 


Absent: rash, itching, pallor


HEMATOLOGIC/IMMUNOLOGIC: 


Absent: easy bleeding, easy bruising, lymphadenopathy, frequent infections


ENDOCRINE:


Absent: unexplained weight gain, unexplained weight loss, heat intolerance, 

cold intolerance


NEUROLOGIC: 


Absent: headache, focal weakness or paresthesias, dizziness, unsteady gait, 

seizure, mental status changes, bladder or bowel incontinence


PSYCHIATRIC: 


Absent: anxiety, depression, suicidal or homicidal ideation, hallucinations.








PHYSICAL EXAMINATION


 Vital Signs - 24 hr











  18





  15:32 21:00 21:10


 


Temperature 98.1 F  98.6 F


 


Pulse Rate 76  


 


Pulse Rate [   69





Radial]   


 


Respiratory 16 18 16





Rate   


 


Blood Pressure 149/78  


 


Blood Pressure   156/71





[Arm]   


 


O2 Sat by Pulse 100 100 98





Oximetry (%)   














  18





  21:58 06:38


 


Temperature 98.2 F 98.0 F


 


Pulse Rate 67 70


 


Pulse Rate [  





Radial]  


 


Respiratory 18 18





Rate  


 


Blood Pressure 126/55 L 136/51 L


 


Blood Pressure  





[Arm]  


 


O2 Sat by Pulse 100 96





Oximetry (%)  











GENERAL: Awake, alert, and fully oriented, in no acute distress.


HEAD: Normal with no signs of trauma.


EYES: Pupils equal, round and reactive to light, extraocular movements intact, 

sclera anicteric, conjunctiva clear. No lid lag.


EARS, NOSE, THROAT: Ears normal, nares patent, oropharynx clear without 

exudates. Moist mucous membranes.


NECK: Normal range of motion, supple without lymphadenopathy, JVD, or masses.


LUNGS: Breath sounds equal, clear to auscultation bilaterally. No wheezes, and 

no crackles. No accessory muscle use.


HEART: Regular rate and rhythm, S1 and S2 


ABDOMEN: soft, not disended; mild tenderness over LLQ, no guarding, no rebound; 

normoactive bowel sounds


UPPER EXTREMITIES: 2+ pulses, warm, well-perfused. No cyanosis. No clubbing. No 

peripheral edema.


LOWER EXTREMITIES: 2+ pulses, warm, well-perfused. No calf tenderness. No 

peripheral edema. 


NEUROLOGICAL:  Cranial nerves II-XII intact. Normal speech. 


PSYCHIATRIC: Cooperative. Good eye contact. Appropriate mood and affect.


SKIN: Warm, dry, normal turgor


 Laboratory Results - last 24 hr











  18





  16:10 16:50 16:50


 


WBC   12.5 H 


 


RBC   3.91 


 


Hgb   12.1 


 


Hct   36.2 


 


MCV   92.4 


 


MCH   30.9 


 


MCHC   33.5 


 


RDW   11.5 L 


 


Plt Count   238 


 


MPV   8.9 


 


Absolute Neuts (auto)   10.1 


 


Neutrophils %   80.6 


 


Lymphocytes %   7.5 L 


 


Monocytes %   9.5 


 


Eosinophils %   0.9 


 


Basophils %   1.5 


 


Sodium    127 L


 


Potassium    4.4


 


Chloride    95 L


 


Carbon Dioxide    25


 


Anion Gap    7 L


 


BUN    13


 


Creatinine    0.8


 


Creat Clearance w eGFR    > 60


 


Random Glucose    226 H


 


Lactic Acid   


 


Calcium    8.5


 


Total Bilirubin    0.5


 


AST    24


 


ALT    20


 


Alkaline Phosphatase    88


 


Total Protein    5.6 L


 


Albumin    2.8 L


 


Lipase   


 


Urine Color  Yellow  


 


Urine Appearance  Clear  


 


Urine pH  6.5  


 


Ur Specific Gravity  1.015  


 


Urine Protein  1+ H  


 


Urine Glucose (UA)  1+ H  


 


Urine Ketones  Negative  


 


Urine Blood  Negative  


 


Urine Nitrite  Negative  


 


Urine Bilirubin  Negative  


 


Urine Urobilinogen  0.2  


 


Ur Leukocyte Esterase  Negative  


 


Urine RBC  0-2  


 


Urine WBC  0-1  


 


Urine Bacteria  4+  














  18





  16:50 16:50


 


WBC  


 


RBC  


 


Hgb  


 


Hct  


 


MCV  


 


MCH  


 


MCHC  


 


RDW  


 


Plt Count  


 


MPV  


 


Absolute Neuts (auto)  


 


Neutrophils %  


 


Lymphocytes %  


 


Monocytes %  


 


Eosinophils %  


 


Basophils %  


 


Sodium  


 


Potassium  


 


Chloride  


 


Carbon Dioxide  


 


Anion Gap  


 


BUN  


 


Creatinine  


 


Creat Clearance w eGFR  


 


Random Glucose  


 


Lactic Acid   1.2


 


Calcium  


 


Total Bilirubin  


 


AST  


 


ALT  


 


Alkaline Phosphatase  


 


Total Protein  


 


Albumin  


 


Lipase  217 


 


Urine Color  


 


Urine Appearance  


 


Urine pH  


 


Ur Specific Gravity  


 


Urine Protein  


 


Urine Glucose (UA)  


 


Urine Ketones  


 


Urine Blood  


 


Urine Nitrite  


 


Urine Bilirubin  


 


Urine Urobilinogen  


 


Ur Leukocyte Esterase  


 


Urine RBC  


 


Urine WBC  


 


Urine Bacteria  











ASSESSMENT/PLAN:


77 year-old female with a PMH significant for HTN, HLD, Type II diabetes 

insulin dependent, hypothyroidism, gastritis, and sigmoid diverticulosis. 

Admitted for LLQ pain.





LLQ pain


Fluid collection, possible abscess


h/o sigmoid diverticulosis


   --afebrile, no leukocytosis


   -- CTAP w/contrast: focal mesenteric edema within left paramedian aspect 

of lower abdomen/upper pelvis with a 3.3 x 2.3cm ring-enhancing fluid 

collection possible abscess


   --consult for IR for Monday morning to see if collection can be aspirated


   --continue Zosyn and metronidazole


   --follow up esr and crp





Pancreatic duct dilitation


   --minimal to mild dilation of main pancreatic duct, follow up with non-

emergency MRCP





Hypertension


   --BP stable


   --continue amlodipine, losartan





Hyperlipidemia


   --continue pravastatin





Type II diabetes, insulin dependent


   --Levemir 16U qhs


   --Novolog sliding scale coverage





Hypothyroidism


   --TSH elevated, patient states Dr. Blair aware and has been adjusting meds


   --continue home dose levothyroxine 





Gastritis


   --protonix PO





FEN


   Fluids: PO intake adequate


   Electrolytes: replete as indicated


   Nutrition: low sodium, diabetic 





DVT prophylaxis: subq lovenox





Physical therapy





Dispo: continues to require inpatient care. Full code.


























Visit type





- Emergency Visit


Emergency Visit: Yes


ED Registration Date: 18


Care time: The patient presented to the Emergency Department on the above date 

and was hospitalized for further evaluation of their emergent condition.





- New Patient


This patient is new to me today: Yes


Date on this admission: 18





- Critical Care


Critical Care patient: No

## 2018-11-04 LAB
ALBUMIN SERPL-MCNC: 2.4 G/DL (ref 3.5–5)
ALP SERPL-CCNC: 74 U/L (ref 32–92)
ALT SERPL-CCNC: 17 U/L (ref 10–40)
ANION GAP SERPL CALC-SCNC: 10 MMOL/L (ref 8–16)
APTT BLD: 24.8 SECONDS (ref 25.2–36.5)
AST SERPL-CCNC: 17 U/L (ref 10–42)
BASOPHILS # BLD: 0 % (ref 0–2)
BILIRUB SERPL-MCNC: 0.5 MG/DL (ref 0.2–1)
BUN SERPL-MCNC: 11 MG/DL (ref 7–18)
CALCIUM SERPL-MCNC: 8.2 MG/DL (ref 8.4–10.2)
CHLORIDE SERPL-SCNC: 95 MMOL/L (ref 98–107)
CO2 SERPL-SCNC: 27 MMOL/L (ref 22–28)
CREAT SERPL-MCNC: 0.7 MG/DL (ref 0.6–1.3)
DEPRECATED RDW RBC AUTO: 11.3 % (ref 11.6–15.6)
EOSINOPHIL # BLD: 1.9 % (ref 0–4.5)
GLUCOSE SERPL-MCNC: 42 MG/DL (ref 74–106)
HCT VFR BLD CALC: 33.4 % (ref 32.4–45.2)
HGB BLD-MCNC: 11.7 GM/DL (ref 10.7–15.3)
INR BLD: 1.08 (ref 0.82–1.09)
LIPASE SERPL-CCNC: 145 U/L (ref 73–393)
LYMPHOCYTES # BLD: 10.2 % (ref 8–40)
MAGNESIUM SERPL-MCNC: 1.9 MG/DL (ref 1.8–2.4)
MCH RBC QN AUTO: 32.3 PG (ref 25.7–33.7)
MCHC RBC AUTO-ENTMCNC: 35 G/DL (ref 32–36)
MCV RBC: 92.4 FL (ref 80–96)
MONOCYTES # BLD AUTO: 11.6 % (ref 3.8–10.2)
NEUTROPHILS # BLD: 76.3 % (ref 42.8–82.8)
PLATELET # BLD AUTO: 236 K/MM3 (ref 134–434)
PMV BLD: 8.4 FL (ref 7.5–11.1)
POTASSIUM SERPLBLD-SCNC: 4 MMOL/L (ref 3.5–5.1)
PROT SERPL-MCNC: 4.8 G/DL (ref 6.4–8.3)
PT PNL PPP: 12.1 SEC (ref 10.2–13)
RBC # BLD AUTO: 3.61 M/MM3 (ref 3.6–5.2)
SODIUM SERPL-SCNC: 132 MMOL/L (ref 136–145)
WBC # BLD AUTO: 9.8 K/MM3 (ref 4–10.8)

## 2018-11-04 RX ADMIN — INSULIN ASPART SCH UNITS: 100 INJECTION, SOLUTION INTRAVENOUS; SUBCUTANEOUS at 21:31

## 2018-11-04 RX ADMIN — INSULIN ASPART SCH: 100 INJECTION, SOLUTION INTRAVENOUS; SUBCUTANEOUS at 06:37

## 2018-11-04 RX ADMIN — INSULIN ASPART SCH UNITS: 100 INJECTION, SOLUTION INTRAVENOUS; SUBCUTANEOUS at 11:50

## 2018-11-04 RX ADMIN — AMLODIPINE BESYLATE SCH MG: 5 TABLET ORAL at 09:50

## 2018-11-04 RX ADMIN — ENOXAPARIN SODIUM SCH MG: 40 INJECTION SUBCUTANEOUS at 09:51

## 2018-11-04 RX ADMIN — INSULIN DETEMIR SCH UNITS: 100 INJECTION, SOLUTION SUBCUTANEOUS at 21:31

## 2018-11-04 RX ADMIN — INSULIN ASPART SCH UNITS: 100 INJECTION, SOLUTION INTRAVENOUS; SUBCUTANEOUS at 17:21

## 2018-11-04 RX ADMIN — PIPERACILLIN SODIUM,TAZOBACTAM SODIUM SCH MLS/HR: 3; .375 INJECTION, POWDER, FOR SOLUTION INTRAVENOUS at 17:21

## 2018-11-04 RX ADMIN — ASPIRIN 81 MG SCH MG: 81 TABLET ORAL at 09:50

## 2018-11-04 RX ADMIN — PIPERACILLIN SODIUM,TAZOBACTAM SODIUM SCH MLS/HR: 3; .375 INJECTION, POWDER, FOR SOLUTION INTRAVENOUS at 09:50

## 2018-11-04 RX ADMIN — ATORVASTATIN CALCIUM SCH MG: 20 TABLET, FILM COATED ORAL at 21:31

## 2018-11-04 RX ADMIN — PIPERACILLIN SODIUM,TAZOBACTAM SODIUM SCH MLS/HR: 3; .375 INJECTION, POWDER, FOR SOLUTION INTRAVENOUS at 01:06

## 2018-11-04 NOTE — PN
Physical Exam: 


SUBJECTIVE: Patient seen and examined at bedside. Pain is 3/10, intermittent, 

dull. No exacerbating and alleviating factors.








OBJECTIVE:





 Vital Signs











 Period  Temp  Pulse  Resp  BP Sys/Avendano  Pulse Ox


 


 Last 24 Hr  98.7 F-99.1 F  60-68  16-17  111-137/41-61  











GENERAL: Awake, alert, and fully oriented, in no acute distress.


LUNGS: Breath sounds equal, clear to auscultation bilaterally. No wheezes, and 

no crackles. No accessory muscle use.


HEART: Regular rate and rhythm, S1 and S2 


ABDOMEN: soft, not disended; mild tenderness over LLQ, no guarding, no rebound; 

normoactive bowel sounds


UPPER EXTREMITIES: 2+ pulses, warm, well-perfused. No cyanosis. No clubbing. No 

peripheral edema.


LOWER EXTREMITIES: 2+ pulses, warm, well-perfused. No calf tenderness. No 

peripheral edema. 


NEUROLOGICAL:  Cranial nerves II-XII intact. Normal speech. 


PSYCHIATRIC: Cooperative. Good eye contact. Appropriate mood and affect.


SKIN: Warm, dry, normal turgor


 














 Laboratory Results - last 24 hr











  11/03/18 11/03/18 11/04/18





  07:00 21:45 05:35


 


WBC    9.8


 


RBC    3.61


 


Hgb    11.7


 


Hct    33.4


 


MCV    92.4


 


MCH    32.3


 


MCHC    35.0


 


RDW    11.3 L


 


Plt Count    236


 


MPV    8.4


 


Absolute Neuts (auto)    7.5


 


Neutrophils %    76.3


 


Lymphocytes %    10.2


 


Monocytes %    11.6 H


 


Eosinophils %    1.9


 


Basophils %    0.0


 


PT with INR   


 


INR   


 


PTT (Actin FS)   


 


Sodium  132 L  


 


Potassium  4.7  


 


Chloride  97 L  


 


Carbon Dioxide  22  


 


Anion Gap  13  


 


BUN  9  


 


Creatinine  0.7  


 


Creat Clearance w eGFR  > 60  


 


POC Glucometer   298 


 


Random Glucose  213 H  


 


Calcium  7.8 L  


 


Phosphorus  2.6  


 


Magnesium  1.8  


 


Total Bilirubin  0.5  


 


AST  13 L  


 


ALT  20  


 


Alkaline Phosphatase  89  


 


Total Protein  5.3 L  


 


Albumin  2.4 L  


 


Lipase   














  11/04/18 11/04/18 11/04/18





  06:32 07:00 07:00


 


WBC   


 


RBC   


 


Hgb   


 


Hct   


 


MCV   


 


MCH   


 


MCHC   


 


RDW   


 


Plt Count   


 


MPV   


 


Absolute Neuts (auto)   


 


Neutrophils %   


 


Lymphocytes %   


 


Monocytes %   


 


Eosinophils %   


 


Basophils %   


 


PT with INR   12.1 


 


INR   1.08 


 


PTT (Actin FS)   24.8 L 


 


Sodium    132 L


 


Potassium    4.0


 


Chloride    95 L


 


Carbon Dioxide    27


 


Anion Gap    10


 


BUN    11


 


Creatinine    0.7


 


Creat Clearance w eGFR    > 60


 


POC Glucometer  53  


 


Random Glucose    42 L* D


 


Calcium    8.2 L


 


Phosphorus   


 


Magnesium    1.9


 


Total Bilirubin    0.5


 


AST    17  D


 


ALT    17


 


Alkaline Phosphatase    74  D


 


Total Protein    4.8 L


 


Albumin    2.4 L


 


Lipase    145














  11/04/18





  09:40


 


WBC 


 


RBC 


 


Hgb 


 


Hct 


 


MCV 


 


MCH 


 


MCHC 


 


RDW 


 


Plt Count 


 


MPV 


 


Absolute Neuts (auto) 


 


Neutrophils % 


 


Lymphocytes % 


 


Monocytes % 


 


Eosinophils % 


 


Basophils % 


 


PT with INR 


 


INR 


 


PTT (Actin FS) 


 


Sodium 


 


Potassium 


 


Chloride 


 


Carbon Dioxide 


 


Anion Gap 


 


BUN 


 


Creatinine 


 


Creat Clearance w eGFR 


 


POC Glucometer  325


 


Random Glucose 


 


Calcium 


 


Phosphorus 


 


Magnesium 


 


Total Bilirubin 


 


AST 


 


ALT 


 


Alkaline Phosphatase 


 


Total Protein 


 


Albumin 


 


Lipase 








 Current Medications











Generic Name Dose Route Start Last Admin





  Trade Name Freq  PRN Reason Stop Dose Admin


 


Amlodipine Besylate  5 mg  11/03/18 14:45  11/04/18 09:50





  Norvasc -  PO   5 mg





  DAILY MELINA   Administration





     





     





     





     


 


Aspirin  81 mg  11/04/18 10:00  11/04/18 09:50





  Asa -  PO   81 mg





  DAILY MELINA   Administration





     





     





     





     


 


Atorvastatin Calcium  20 mg  11/04/18 22:00  





  Lipitor -  PO   





  HS MELINA   





     





     





     





     


 


Enoxaparin Sodium  40 mg  11/04/18 10:00  11/04/18 09:51





  Lovenox -  SQ   40 mg





  DAILY MELINA   Administration





     





     





     





     


 


Piperacillin Sod/Tazobactam  50 mls @ 100 mls/hr  11/03/18 15:45  11/04/18 17:21





  Sod 3.375 gm/ Dextrose  IVPB   100 mls/hr





  Q8H-IV MELINA   Administration





     





     





  Protocol   





     


 


Insulin Aspart  0 units  11/03/18 16:30  11/04/18 17:21





  Novolog Vial  SQ   2 units





  [facility] MELINA   Administration





     





     





  Protocol   





     


 


Insulin Detemir  16 units  11/03/18 22:00  11/03/18 22:30





  Levemir Vial  SQ   16 units





  HS MELINA   Administration





     





     





     





     


 


Levothyroxine Sodium  112 mcg  11/05/18 07:00  





  Synthroid -  PO   





  DAILY@0700 MELINA   





     





     





     





     


 


Losartan Potassium  100 mg  11/05/18 10:00  





  Cozaar -  PO   





  DAILY MELINA   





     





     





     





     


 


Non-Formulary Medication  1 each  11/04/18 22:00  





  Propylene Glycol/Peg 400/Pf [Systane 0.3-0.4% Eye Drop]  OU   





  TID MELINA   





     





     





     





     











ASSESSMENT/PLAN:


77 year-old female with a PMH significant for HTN, HLD, Type II diabetes 

insulin dependent, hypothyroidism, gastritis, and sigmoid diverticulosis. 

Admitted for LLQ pain.





LLQ pain


Fluid collection, possible abscess


h/o sigmoid diverticulosis


   --afebrile, no leukocytosis


   --11/2 CTAP w/contrast: focal mesenteric edema within left paramedian aspect 

of lower abdomen/upper pelvis with a 3.3 x 2.3cm ring-enhancing fluid 

collection possible abscess


   --consult for IR for Monday morning to see if collection can be aspirated


   --continue Zosyn and metronidazole


   --follow up esr and crp





Pancreatic duct dilitation


   --minimal to mild dilation of main pancreatic duct, follow up with non-

emergency MRCP





Hypertension


   --BP stable


   --continue amlodipine, losartan





Hyperlipidemia


   --continue pravastatin





Type II diabetes, insulin dependent


   --Levemir 16U qhs


   --Novolog sliding scale coverage





Hypothyroidism


   --TSH elevated, patient states Dr. Blair aware and has been adjusting meds


   --continue home dose levothyroxine 





Gastritis


   --protonix PO





FEN


   Fluids: PO intake adequate


   Electrolytes: replete as indicated


   Nutrition: low sodium, diabetic 





DVT prophylaxis: subq lovenox





Physical therapy





Dispo: continues to require inpatient care. Full code.











Visit type





- Emergency Visit


Emergency Visit: Yes


ED Registration Date: 11/02/18


Care time: The patient presented to the Emergency Department on the above date 

and was hospitalized for further evaluation of their emergent condition.





- New Patient


This patient is new to me today: No





- Critical Care


Critical Care patient: No

## 2018-11-05 LAB
ALBUMIN SERPL-MCNC: 2.5 G/DL (ref 3.5–5)
ALP SERPL-CCNC: 74 U/L (ref 32–92)
ALT SERPL-CCNC: 19 U/L (ref 10–40)
ANION GAP SERPL CALC-SCNC: 8 MMOL/L (ref 8–16)
AST SERPL-CCNC: 19 U/L (ref 10–42)
BASOPHILS # BLD: 0.2 % (ref 0–2)
BILIRUB SERPL-MCNC: 0.2 MG/DL (ref 0.2–1)
BUN SERPL-MCNC: 10 MG/DL (ref 7–18)
CALCIUM SERPL-MCNC: 8.5 MG/DL (ref 8.4–10.2)
CHLORIDE SERPL-SCNC: 96 MMOL/L (ref 98–107)
CO2 SERPL-SCNC: 27 MMOL/L (ref 22–28)
CREAT SERPL-MCNC: 0.8 MG/DL (ref 0.6–1.3)
DEPRECATED RDW RBC AUTO: 11.6 % (ref 11.6–15.6)
EOSINOPHIL # BLD: 1.1 % (ref 0–4.5)
GLUCOSE SERPL-MCNC: 240 MG/DL (ref 74–106)
HCT VFR BLD CALC: 35.4 % (ref 32.4–45.2)
HGB BLD-MCNC: 11.8 GM/DL (ref 10.7–15.3)
LYMPHOCYTES # BLD: 9.5 % (ref 8–40)
MAGNESIUM SERPL-MCNC: 1.8 MG/DL (ref 1.8–2.4)
MCH RBC QN AUTO: 31.2 PG (ref 25.7–33.7)
MCHC RBC AUTO-ENTMCNC: 33.5 G/DL (ref 32–36)
MCV RBC: 93.1 FL (ref 80–96)
MONOCYTES # BLD AUTO: 6.3 % (ref 3.8–10.2)
NEUTROPHILS # BLD: 82.9 % (ref 42.8–82.8)
PLATELET # BLD AUTO: 275 K/MM3 (ref 134–434)
PMV BLD: 8.2 FL (ref 7.5–11.1)
POTASSIUM SERPLBLD-SCNC: 4.3 MMOL/L (ref 3.5–5.1)
PROT SERPL-MCNC: 5.3 G/DL (ref 6.4–8.3)
RBC # BLD AUTO: 3.8 M/MM3 (ref 3.6–5.2)
SODIUM SERPL-SCNC: 131 MMOL/L (ref 136–145)
WBC # BLD AUTO: 9.7 K/MM3 (ref 4–10.8)

## 2018-11-05 RX ADMIN — LEVOTHYROXINE SODIUM SCH MCG: 112 TABLET ORAL at 06:49

## 2018-11-05 RX ADMIN — PIPERACILLIN SODIUM,TAZOBACTAM SODIUM SCH MLS/HR: 3; .375 INJECTION, POWDER, FOR SOLUTION INTRAVENOUS at 09:12

## 2018-11-05 RX ADMIN — ASPIRIN 81 MG SCH MG: 81 TABLET ORAL at 09:13

## 2018-11-05 RX ADMIN — ENOXAPARIN SODIUM SCH MG: 40 INJECTION SUBCUTANEOUS at 09:15

## 2018-11-05 RX ADMIN — INSULIN ASPART SCH: 100 INJECTION, SOLUTION INTRAVENOUS; SUBCUTANEOUS at 21:36

## 2018-11-05 RX ADMIN — INSULIN ASPART SCH UNITS: 100 INJECTION, SOLUTION INTRAVENOUS; SUBCUTANEOUS at 16:51

## 2018-11-05 RX ADMIN — INSULIN ASPART SCH UNITS: 100 INJECTION, SOLUTION INTRAVENOUS; SUBCUTANEOUS at 06:50

## 2018-11-05 RX ADMIN — PIPERACILLIN SODIUM,TAZOBACTAM SODIUM SCH MLS/HR: 3; .375 INJECTION, POWDER, FOR SOLUTION INTRAVENOUS at 01:43

## 2018-11-05 RX ADMIN — ATORVASTATIN CALCIUM SCH MG: 20 TABLET, FILM COATED ORAL at 21:36

## 2018-11-05 RX ADMIN — AMLODIPINE BESYLATE SCH MG: 5 TABLET ORAL at 09:13

## 2018-11-05 RX ADMIN — LOSARTAN POTASSIUM SCH MG: 50 TABLET, FILM COATED ORAL at 09:13

## 2018-11-05 RX ADMIN — PANTOPRAZOLE SODIUM SCH MG: 40 TABLET, DELAYED RELEASE ORAL at 09:13

## 2018-11-05 RX ADMIN — PIPERACILLIN SODIUM,TAZOBACTAM SODIUM SCH MLS/HR: 3; .375 INJECTION, POWDER, FOR SOLUTION INTRAVENOUS at 17:01

## 2018-11-05 RX ADMIN — INSULIN ASPART SCH UNITS: 100 INJECTION, SOLUTION INTRAVENOUS; SUBCUTANEOUS at 11:58

## 2018-11-05 NOTE — PN
Progress Note (short form)





- Note


Progress Note: 





Patient seen and consult dictated.


Patient with likely intraabdominal infection - mesenteric inflammation on left 

side (per CT scan) along with ?abscess and c/o abdominal pain. Feels better on 

IV antibiotics with improving WBC and no fever or chills.


Agree with plans per ID


Tolerating PO


No GII intervention at this time

## 2018-11-05 NOTE — CONS
DATE OF CONSULTATION:  11/05/2018

 

Asked to evaluate this 77-year-old female with a possible abscess in the left side of

the abdomen.

 

The patient is a 77-year-old female with a past history of type 2 diabetes mellitus,

hypothyroidism, hypertension, and hypercholesterolemia.  She presented to her primary

care doctor with some abdominal pain and vomiting, with some diminishment in her

appetite.  She was sent for a CAT scan of the abdomen which showed a possible 3-cm

abscess in the left mesentery, left lower quadrant, and the patient was admitted to

the hospital.  At the time of admission, she had an elevated white count of 12.5 with

normal chemistries.  She was seen by Infectious Disease and started empirically on

antibiotics intravenously.  The patient has remained afebrile during the hospital

course and states that her abdominal discomfort has stabilized and improved somewhat.

 She is currently seen sitting in a chair comfortably, eating her lunch.  Currently,

she is afebrile with stable blood pressure and heart rate.  Her blood cultures are

negative to date.  The patient denies any prior similar history in the past.  She has

had colonoscopies previous and believes the last one may have been 2-3 years ago by

Dr. Griffin.  She is not aware of being told of diverticulitis or diverticulosis. 

Currently, she is on antibiotics for the mesenteric edema, possible

abscess/infection.  Her most recent white blood count has improved to 9.7.

 

PHYSICAL EXAMINATION:

General:  The patient is a well-developed, well-nourished female, alert, in no

obvious discomfort.  She does describe some discomfort when she presses deeply in the

left lower quadrant.

Lungs:  Clear.

Cardiac:  Regular rate and rhythm.

Abdomen:  Soft, flat, and with minimal discomfort to deep palpation in the left mid

and lower abdomen but no rebound, guarding, or mass.

 

Patient with probable infection in the mesentery in the left side of the abdomen and

possible small abscess which is apparently not amenable to drainage by Interventional

Radiology.  Patient on Zosyn per Infectious Disease and clinically improving.  Would

continue current regimen per ID, and we will follow clinically as needed.  No

indication for any GI endoscopy or further interventions at the present time.

 

 

HUANG HUERTA M.D.

 

[id number]

DD: 11/05/2018 13:01

DT: 11/05/2018 21:12

Job #:  [id number]

## 2018-11-05 NOTE — PN
Progress Note, Physician


History of Present Illness: 





C/O abdominal discomfort- points to across abdomen


No N/V     Tolerated solid diet for breakfast


Reports small BM     No rectal bleeding


Denies fever/ chills


WBC WNL


BC no growth





- Current Medication List


Current Medications: 


Active Medications





Amlodipine Besylate (Norvasc -)  5 mg PO DAILY [facility]


   Last Admin: 11/05/18 09:13 Dose:  5 mg


Aspirin (Asa -)  81 mg PO DAILY [facility]


   Last Admin: 11/05/18 09:13 Dose:  81 mg


Atorvastatin Calcium (Lipitor -)  20 mg PO HS [facility]


   Last Admin: 11/04/18 21:31 Dose:  20 mg


Enoxaparin Sodium (Lovenox -)  40 mg SQ DAILY [facility]


   Last Admin: 11/05/18 09:15 Dose:  40 mg


Piperacillin Sod/Tazobactam (Sod 3.375 gm/ Dextrose)  50 mls @ 100 mls/hr IVPB 

Q8H-IV [facility]; Protocol


   Last Admin: 11/05/18 09:12 Dose:  100 mls/hr


Insulin Aspart (Novolog Vial)  0 units SQ ACHS [facility]; Protocol


   Last Admin: 11/05/18 06:50 Dose:  6 units


Insulin Detemir (Levemir Vial)  16 units SQ DAILY [facility]


   Last Admin: 11/05/18 09:14 Dose:  16 units


Levothyroxine Sodium (Synthroid -)  112 mcg PO DAILY@0700 [facility]


   Last Admin: 11/05/18 06:49 Dose:  112 mcg


Losartan Potassium (Cozaar -)  100 mg PO DAILY [facility]


   Last Admin: 11/05/18 09:13 Dose:  100 mg


Non-Formulary Medication (Propylene Glycol/Peg 400/Pf [Systane 0.3-0.4% Eye Drop

])  1 each OU TID [facility]


Pantoprazole Sodium (Protonix -)  40 mg PO DAILY [facility]


   Last Admin: 11/05/18 09:13 Dose:  40 mg











- Objective


Vital Signs: 


 Vital Signs











Temperature  98.0 F   11/05/18 09:00


 


Pulse Rate  59 L  11/05/18 09:00


 


Respiratory Rate  17   11/05/18 09:00


 


Blood Pressure  106/59 L  11/05/18 09:00


 


O2 Sat by Pulse Oximetry (%)  98   11/05/18 05:21











Constitutional: Yes: No Distress


Eyes: Yes: Conjunctiva Clear


Cardiovascular: Yes: Regular Rate and Rhythm, S1, S2


Respiratory: Yes: CTA Bilaterally


Gastrointestinal: Yes: Normal Bowel Sounds, Soft.  No: Tenderness


Edema: No


Labs: 


 CBC, BMP





 11/05/18 07:30 





 11/05/18 07:30 





 INR, PTT











INR  1.08  (0.82-1.09)   11/04/18  07:00    














Assessment/Plan





Mesenteric edema, possible abscess   ? etiology


Cultures pending


Continue empiric coverage GI pathogens with zosyn


Advise GI/IR consults

## 2018-11-05 NOTE — PN
Physical Exam: 


SUBJECTIVE: Patient seen and examined, tolerating regular diet, reports minimal 

abdominal pain.  








OBJECTIVE: Patient is a 77 year-old female with a PMH significant for HTN, HLD, 

Type II diabetes insulin dependent, hypothyroidism, gastritis, and sigmoid 

diverticulosis. patient was admitted from the emergency department for 

mesenteric abscess.  





 Vital Signs











 Period  Temp  Pulse  Resp  BP Sys/Avendano  Pulse Ox


 


 Last 24 Hr  97.9 F-99.0 F  54-66  16-18  121-139/52-57  96-98











GENERAL: The patient is awake, alert, and fully oriented, in no acute distress.


HEAD: Normal with no signs of trauma.


EYES: PERRL, extraocular movements intact, sclera anicteric, conjunctiva clear. 

No ptosis. 


ENT: Ears normal, nares patent, oropharynx clear without exudates, moist mucous 


membranes.


NECK: Trachea midline, full range of motion, supple. 


LUNGS: Breath sounds equal, clear to auscultation bilaterally, no wheezes, no 

crackles, no 


accessory muscle use. 


HEART: Regular rate and rhythm, S1, S2 without murmur, rub or gallop.


ABDOMEN: Soft, tenderness upon deep palpation to the Left lower quadrant,  

nondistended, normoactive bowel sounds, no guarding, no 


rebound, no hepatosplenomegaly, no masses.


EXTREMITIES: 2+ pulses, warm, well-perfused, no edema. 


NEUROLOGICAL: Cranial nerves II through XII grossly intact. Normal speech, gait 

not 


observed.


PSYCH: Normal mood, normal affect.


SKIN: Warm, dry, normal turgor, no rashes or lesions noted














 Laboratory Results - last 24 hr








 CBC











WBC  9.7 K/mm3 (4.0-10.8)   11/05/18  07:30    


 


RBC  3.80 M/mm3 (3.60-5.2)   11/05/18  07:30    


 


Hgb  11.8 GM/dl (10.7-15.3)   11/05/18  07:30    


 


Hct  35.4 % (32.4-45.2)   11/05/18  07:30    


 


MCV  93.1 fl (80-96)   11/05/18  07:30    


 


MCH  31.2 pg (25.7-33.7)   11/05/18  07:30    


 


MCHC  33.5 g/dl (32.0-36.0)   11/05/18  07:30    


 


RDW  11.6 % (11.6-15.6)   11/05/18  07:30    


 


Plt Count  275 K/MM3 (134-434)   11/05/18  07:30    


 


MPV  8.2 fl (7.5-11.1)   11/05/18  07:30    


 


Absolute Neuts (auto)  8.1 K/mm3  11/05/18  07:30    


 


Neutrophils %  82.9 % (42.8-82.8)  H  11/05/18  07:30    


 


Lymphocytes %  9.5 % (8-40)   11/05/18  07:30    


 


Monocytes %  6.3 % (3.8-10.2)   11/05/18  07:30    


 


Eosinophils %  1.1 % (0-4.5)   11/05/18  07:30    


 


Basophils %  0.2 % (0-2.0)   11/05/18  07:30    








 CMP











Sodium  131 mmol/L (136-145)  L  11/05/18  07:30    


 


Potassium  4.3 mmol/L (3.5-5.1)   11/05/18  07:30    


 


Chloride  96 mmol/L ()  L  11/05/18  07:30    


 


Carbon Dioxide  27 mmol/L (22-28)   11/05/18  07:30    


 


Anion Gap  8 MMOL/L (8-16)   11/05/18  07:30    


 


BUN  10 mg/dl (7-18)   11/05/18  07:30    


 


Creatinine  0.8 mg/dl (0.6-1.3)   11/05/18  07:30    


 


Creat Clearance w eGFR  > 60  (>60)   11/05/18  07:30    


 


POC Glucometer  168 UNITS ()   11/05/18  11:52    


 


Random Glucose  240 mg/dl ()  H D 11/05/18  07:30    


 


Lactic Acid  1.2 mmol/L (0.4-2.0)   11/02/18  16:50    


 


Calcium  8.5 mg/dl (8.4-10.2)   11/05/18  07:30    


 


Phosphorus  2.6 mg/dL (2.5-4.9)   11/03/18  07:00    


 


Magnesium  1.8 mg/dL (1.8-2.4)   11/05/18  07:30    


 


Total Bilirubin  0.2 mg/dl (0.2-1.0)   11/05/18  07:30    


 


AST  19 U/L (10-42)   11/05/18  07:30    


 


ALT  19 U/L (10-40)   11/05/18  07:30    


 


Alkaline Phosphatase  74 U/L (32-92)   11/05/18  07:30    


 


C-Reactive Protein  6.6 MG/DL (0.00-0.3)  H  11/04/18  07:00    


 


Total Protein  5.3 g/dl (6.4-8.3)  L  11/05/18  07:30    


 


Albumin  2.5 g/dl (3.5-5.0)  L  11/05/18  07:30    


 


Lipase  145 U/L ()   11/04/18  07:00    


 


TSH  8.09 uIU/ml (0.358-3.74)  H  11/03/18  07:00    

















Active Medications











Generic Name Dose Route Start Last Admin





  Trade Name Freq  PRN Reason Stop Dose Admin


 


Amlodipine Besylate  5 mg  11/03/18 14:45  11/04/18 09:50





  Norvasc -  PO   5 mg





  DAILY MELINA   Administration





     





     





     





     


 


Aspirin  81 mg  11/04/18 10:00  11/04/18 09:50





  Asa -  PO   81 mg





  DAILY MELINA   Administration





     





     





     





     


 


Atorvastatin Calcium  20 mg  11/04/18 22:00  11/04/18 21:31





  Lipitor -  PO   20 mg





  HS MELINA   Administration





     





     





     





     


 


Enoxaparin Sodium  40 mg  11/04/18 10:00  11/04/18 09:51





  Lovenox -  SQ   40 mg





  DAILY MELINA   Administration





     





     





     





     


 


Piperacillin Sod/Tazobactam  50 mls @ 100 mls/hr  11/03/18 15:45  11/05/18 01:43





  Sod 3.375 gm/ Dextrose  IVPB   100 mls/hr





  Q8H-IV MELINA   Administration





     





     





  Protocol   





     


 


Insulin Aspart  0 units  11/03/18 16:30  11/05/18 06:50





  Novolog Vial  SQ   6 units





  ACHS MELINA   Administration





     





     





  Protocol   





     


 


Insulin Detemir  16 units  11/03/18 22:00  11/04/18 21:31





  Levemir Vial  SQ   16 units





  HS MELINA   Administration





     





     





     





     


 


Levothyroxine Sodium  112 mcg  11/05/18 07:00  11/05/18 06:49





  Synthroid -  PO   112 mcg





  DAILY@0700 MELINA   Administration





     





     





     





     


 


Losartan Potassium  100 mg  11/05/18 10:00  





  Cozaar -  PO   





  DAILY [facility]   





     





     





     





     


 


Non-Formulary Medication  1 each  11/04/18 22:00  





  Propylene Glycol/Peg 400/Pf [Systane 0.3-0.4% Eye Drop]  OU   





  TID [facility]   





     





     





     





     


 


Pantoprazole Sodium  40 mg  11/05/18 10:00  





  Protonix -  PO   





  DAILY [facility]   





     





     





     





     








 Microbiology





11/02/18 16:10   Urine - Urine Clean Catch   Urine Culture - Final


                            Escherichia Coli


11/03/18 15:05   Blood - Peripheral Venous   Blood Culture - Preliminary


                            NO GROWTH OBTAINED AFTER 24 HOURS, INCUBATION TO 

CONTINUE


                            FOR 4 DAYS.


11/03/18 14:50   Blood - Peripheral Venous   Blood Culture - Preliminary


                            NO GROWTH OBTAINED AFTER 24 HOURS, INCUBATION TO 

CONTINUE


                            FOR 4 DAYS.


IMAGING


CT abdomen/pelvis with contrast: mesenteric edema, 3.3 x 2.3 possibly 

representing an abscess minimal mild dilation of the main pancreatic duct 

within the body





ASSESSMENT/PLAN:





1) GI


Fluid collection, possible abscess


h/o sigmoid diverticulosis


- afebrile, no leukocytosis, discussed with interventional radiologist, Dr Yuan, abscess can not be drained advised to continue iv abx


- will continue zosyn day 3


- appreciate GI input (Feliciano)


Pancreatic duct dilitation


   --minimal to mild dilation of main pancreatic duct, follow up with non-

emergency MRCP





2) cardiovascularu


Hypertension


- b/p at goal, continue amlodipine, losartan


Hyperlipidemia


- continue pravastatin





3) endo


Type II diabetes, insulin dependent


-Levemir 16U in am (as per home med regimen) 


-Novolog sliding scale coverage


Hypothyroidism


- TSH elevated, patient states Dr. Blair aware and has been adjusting meds, 

pending t4


- will continue home dose levothyroxine 





4) 


uti


- urine culture +ecoli, continue zosyn 





FEN


   Fluids: PO intake adequate


   Electrolytes: hyponatremia, improved with iv hydration, correct serum sodium 

today 134


   Nutrition: low sodium, diabetic 





DVT prophylaxis: subq lovenox





Physical therapy





Dispo: continues to require inpatient care. Full code.








Visit type





- Emergency Visit


Emergency Visit: Yes


ED Registration Date: 11/02/18


Care time: The patient presented to the Emergency Department on the above date 

and was hospitalized for further evaluation of their emergent condition.





- New Patient


This patient is new to me today: No





- Critical Care


Critical Care patient: No





- Discharge Referral


Referred to [facility] No

## 2018-11-06 LAB
ALBUMIN SERPL-MCNC: 2.7 G/DL (ref 3.5–5)
ALP SERPL-CCNC: 79 U/L (ref 32–92)
ALT SERPL-CCNC: 18 U/L (ref 10–40)
ANION GAP SERPL CALC-SCNC: 6 MMOL/L (ref 8–16)
AST SERPL-CCNC: 24 U/L (ref 10–42)
BASOPHILS # BLD: 0.4 % (ref 0–2)
BILIRUB SERPL-MCNC: 0.3 MG/DL (ref 0.2–1)
BUN SERPL-MCNC: 10 MG/DL (ref 7–18)
CALCIUM SERPL-MCNC: 8.4 MG/DL (ref 8.4–10.2)
CHLORIDE SERPL-SCNC: 95 MMOL/L (ref 98–107)
CO2 SERPL-SCNC: 27 MMOL/L (ref 22–28)
CREAT SERPL-MCNC: 0.8 MG/DL (ref 0.6–1.3)
DEPRECATED RDW RBC AUTO: 11.6 % (ref 11.6–15.6)
EOSINOPHIL # BLD: 1.9 % (ref 0–4.5)
GLUCOSE SERPL-MCNC: 214 MG/DL (ref 74–106)
HCT VFR BLD CALC: 35.8 % (ref 32.4–45.2)
HGB BLD-MCNC: 12.5 GM/DL (ref 10.7–15.3)
LYMPHOCYTES # BLD: 9.4 % (ref 8–40)
MAGNESIUM SERPL-MCNC: 1.6 MG/DL (ref 1.8–2.4)
MCH RBC QN AUTO: 32 PG (ref 25.7–33.7)
MCHC RBC AUTO-ENTMCNC: 34.8 G/DL (ref 32–36)
MCV RBC: 92 FL (ref 80–96)
MONOCYTES # BLD AUTO: 7.4 % (ref 3.8–10.2)
NEUTROPHILS # BLD: 80.9 % (ref 42.8–82.8)
PHOSPHATE SERPL-MCNC: 2.4 MG/DL (ref 2.5–4.6)
PLATELET # BLD AUTO: 309 K/MM3 (ref 134–434)
PMV BLD: 7.7 FL (ref 7.5–11.1)
POTASSIUM SERPLBLD-SCNC: 4.1 MMOL/L (ref 3.5–5.1)
PROT SERPL-MCNC: 5.6 G/DL (ref 6.4–8.3)
RBC # BLD AUTO: 3.89 M/MM3 (ref 3.6–5.2)
SODIUM SERPL-SCNC: 128 MMOL/L (ref 136–145)
WBC # BLD AUTO: 9.6 K/MM3 (ref 4–10.8)

## 2018-11-06 RX ADMIN — LEVOTHYROXINE SODIUM SCH MCG: 112 TABLET ORAL at 06:37

## 2018-11-06 RX ADMIN — PIPERACILLIN SODIUM,TAZOBACTAM SODIUM SCH MLS/HR: 3; .375 INJECTION, POWDER, FOR SOLUTION INTRAVENOUS at 09:13

## 2018-11-06 RX ADMIN — DOCUSATE SODIUM SCH MG: 100 CAPSULE, LIQUID FILLED ORAL at 06:37

## 2018-11-06 RX ADMIN — AMLODIPINE BESYLATE SCH MG: 5 TABLET ORAL at 09:13

## 2018-11-06 RX ADMIN — PIPERACILLIN SODIUM,TAZOBACTAM SODIUM SCH MLS/HR: 3; .375 INJECTION, POWDER, FOR SOLUTION INTRAVENOUS at 17:13

## 2018-11-06 RX ADMIN — ASPIRIN 81 MG SCH MG: 81 TABLET ORAL at 09:12

## 2018-11-06 RX ADMIN — INSULIN ASPART SCH UNITS: 100 INJECTION, SOLUTION INTRAVENOUS; SUBCUTANEOUS at 16:30

## 2018-11-06 RX ADMIN — POLYVINYL ALCOHOL SCH DROP: 14 SOLUTION/ DROPS OPHTHALMIC at 21:18

## 2018-11-06 RX ADMIN — POLYVINYL ALCOHOL SCH: 14 SOLUTION/ DROPS OPHTHALMIC at 03:08

## 2018-11-06 RX ADMIN — DOCUSATE SODIUM SCH MG: 100 CAPSULE, LIQUID FILLED ORAL at 21:18

## 2018-11-06 RX ADMIN — DOCUSATE SODIUM SCH MG: 100 CAPSULE, LIQUID FILLED ORAL at 14:00

## 2018-11-06 RX ADMIN — ENOXAPARIN SODIUM SCH MG: 40 INJECTION SUBCUTANEOUS at 09:13

## 2018-11-06 RX ADMIN — ATORVASTATIN CALCIUM SCH MG: 20 TABLET, FILM COATED ORAL at 21:18

## 2018-11-06 RX ADMIN — INSULIN ASPART SCH UNITS: 100 INJECTION, SOLUTION INTRAVENOUS; SUBCUTANEOUS at 07:06

## 2018-11-06 RX ADMIN — POLYETHYLENE GLYCOL 3350 SCH GM: 17 POWDER, FOR SOLUTION ORAL at 09:13

## 2018-11-06 RX ADMIN — PIPERACILLIN SODIUM,TAZOBACTAM SODIUM SCH MLS/HR: 3; .375 INJECTION, POWDER, FOR SOLUTION INTRAVENOUS at 01:19

## 2018-11-06 RX ADMIN — POLYVINYL ALCOHOL SCH DROP: 14 SOLUTION/ DROPS OPHTHALMIC at 19:31

## 2018-11-06 RX ADMIN — POLYETHYLENE GLYCOL 3350 SCH GM: 17 POWDER, FOR SOLUTION ORAL at 21:18

## 2018-11-06 RX ADMIN — PANTOPRAZOLE SODIUM SCH MG: 40 TABLET, DELAYED RELEASE ORAL at 09:13

## 2018-11-06 RX ADMIN — INSULIN ASPART SCH UNITS: 100 INJECTION, SOLUTION INTRAVENOUS; SUBCUTANEOUS at 21:18

## 2018-11-06 RX ADMIN — INSULIN ASPART SCH UNITS: 100 INJECTION, SOLUTION INTRAVENOUS; SUBCUTANEOUS at 11:10

## 2018-11-06 RX ADMIN — LOSARTAN POTASSIUM SCH MG: 50 TABLET, FILM COATED ORAL at 09:13

## 2018-11-06 RX ADMIN — POLYVINYL ALCOHOL SCH DROP: 14 SOLUTION/ DROPS OPHTHALMIC at 06:38

## 2018-11-06 NOTE — PN
Physical Exam: 


SUBJECTIVE: Patient seen and examined; no events reported overnight.  Abdominal 

pain controlled.  Na worse today; fell to 128 from 131.  Checking further labs.

   Hemodynamically stable and afebrile.  Continue to monitor on the floor.  

Appreciate input from specialty services








OBJECTIVE:





 Vital Signs











 Period  Temp  Pulse  Resp  BP Sys/Avendano  Pulse Ox


 


 Last 24 Hr  97.8 F-98.6 F  57-70  16-18  119-129/44-51  











GENERAL: The patient is awake, alert, and fully oriented, in no acute distress.


HEAD: Normal with no signs of trauma.


EYES: PERRL, extraocular movements intact, sclera anicteric, conjunctiva clear. 

No ptosis. 


ENT: Ears normal, nares patent, oropharynx clear without exudates, moist mucous 


membranes.


NECK: Trachea midline, full range of motion, supple. 


LUNGS: Breath sounds equal, clear to auscultation bilaterally, no wheezes, no 

crackles, no 


accessory muscle use. 


HEART: Regular rate and rhythm, S1, S2 without murmur, rub or gallop.


ABDOMEN: Soft, nontender, nondistended, normoactive bowel sounds, no guarding, 

no 


rebound, no hepatosplenomegaly, no masses.


EXTREMITIES: 2+ pulses, warm, well-perfused, no edema. 


NEUROLOGICAL: Cranial nerves II through XII grossly intact. Normal speech, gait 

not 


observed.


PSYCH: Normal mood, normal affect.


SKIN: Warm, dry, normal turgor, no rashes or lesions noted














 Laboratory Results - last 24 hr











  11/05/18 11/05/18 11/06/18





  15:53 21:32 06:32


 


WBC   


 


RBC   


 


Hgb   


 


Hct   


 


MCV   


 


MCH   


 


MCHC   


 


RDW   


 


Plt Count   


 


MPV   


 


Absolute Neuts (auto)   


 


Neutrophils %   


 


Lymphocytes %   


 


Monocytes %   


 


Eosinophils %   


 


Basophils %   


 


Sodium   


 


Potassium   


 


Chloride   


 


Carbon Dioxide   


 


Anion Gap   


 


BUN   


 


Creatinine   


 


Creat Clearance w eGFR   


 


POC Glucometer  281  239  241


 


Random Glucose   


 


Calcium   


 


Phosphorus   


 


Magnesium   


 


Total Bilirubin   


 


AST   


 


ALT   


 


Alkaline Phosphatase   


 


Total Protein   


 


Albumin   














  11/06/18 11/06/18





  08:40 08:40


 


WBC  9.6 


 


RBC  3.89 


 


Hgb  12.5 


 


Hct  35.8 


 


MCV  92.0 


 


MCH  32.0 


 


MCHC  34.8 


 


RDW  11.6 


 


Plt Count  309 


 


MPV  7.7 


 


Absolute Neuts (auto)  7.8 


 


Neutrophils %  80.9 


 


Lymphocytes %  9.4 


 


Monocytes %  7.4 


 


Eosinophils %  1.9 


 


Basophils %  0.4 


 


Sodium   128 L


 


Potassium   4.1


 


Chloride   95 L


 


Carbon Dioxide   27


 


Anion Gap   6 L


 


BUN   10


 


Creatinine   0.8


 


Creat Clearance w eGFR   > 60


 


POC Glucometer  


 


Random Glucose   214 H


 


Calcium   8.4


 


Phosphorus   2.4 L


 


Magnesium   1.6 L


 


Total Bilirubin   0.3


 


AST   24  D


 


ALT   18


 


Alkaline Phosphatase   79


 


Total Protein   5.6 L


 


Albumin   2.7 L








Active Medications











Generic Name Dose Route Start Last Admin





  Trade Name Freq  PRN Reason Stop Dose Admin


 


Amlodipine Besylate  5 mg  11/03/18 14:45  11/06/18 09:13





  Norvasc -  PO   5 mg





  DAILY MELINA   Administration





     





     





     





     


 


Artificial Tears  1 drop  11/04/18 22:00  11/06/18 06:38





  Artificial Tears  OU   1 drop





  TID MELINA   Administration





     





     





     





     


 


Aspirin  81 mg  11/04/18 10:00  11/06/18 09:12





  Asa -  PO   81 mg





  DAILY MELINA   Administration





     





     





     





     


 


Atorvastatin Calcium  20 mg  11/04/18 22:00  11/05/18 21:36





  Lipitor -  PO   20 mg





  HS MELINA   Administration





     





     





     





     


 


Docusate Sodium  100 mg  11/06/18 06:00  11/06/18 06:37





  Colace -  PO   100 mg





  TID MELINA   Administration





     





     





     





     


 


Enoxaparin Sodium  40 mg  11/04/18 10:00  11/06/18 09:13





  Lovenox -  SQ   40 mg





  DAILY MELINA   Administration





     





     





     





     


 


Piperacillin Sod/Tazobactam  50 mls @ 100 mls/hr  11/03/18 15:45  11/06/18 09:13





  Sod 3.375 gm/ Dextrose  IVPB   100 mls/hr





  Q8H-IV MELINA   Administration





     





     





  Protocol   





     


 


Insulin Aspart  0 units  11/03/18 16:30  11/06/18 07:06





  Novolog Vial  SQ   4 units





  ACHS MELINA   Administration





     





     





  Protocol   





     


 


Insulin Detemir  16 units  11/06/18 07:00  11/06/18 07:04





  Levemir Vial  SQ   16 units





  ACBK MELINA   Administration





     





     





     





     


 


Levothyroxine Sodium  112 mcg  11/05/18 07:00  11/06/18 06:37





  Synthroid -  PO   112 mcg





  DAILY@0700 MELINA   Administration





     





     





     





     


 


Losartan Potassium  100 mg  11/05/18 10:00  11/06/18 09:13





  Cozaar -  PO   100 mg





  DAILY MELINA   Administration





     





     





     





     


 


Pantoprazole Sodium  40 mg  11/05/18 10:00  11/06/18 09:13





  Protonix -  PO   40 mg





  DAILY MELINA   Administration





     





     





     





     


 


Polyethylene Glycol  17 gm  11/06/18 10:00  11/06/18 09:13





  Miralax (For Daily Use) -  PO   17 gm





  BID MELINA   Administration





     





     





     





     











ASSESSMENT/PLAN:


Mrs. Canchola is a 76 y/o female presenting to the hospital found to have a 

possible abdominal abscess; she is doing well and remains hemodynamically 

stable and afebrile on Zosyn day 4.  This isn't amiable to drainage per IR; per 

sgy no role for GI.  They continue to follow.  Appreciate specialst support; 

continue to monitor





1) Abdominal Abscess


-imaging and prior reports reviewed


-ID following; Zosyn day #4; afebrile without any new white count.


-Continue to monitor for fevers, monitor CBC. 


-Check ESR/CRP in AM





2) Pancreatic Ductal Dilation (main duct)


-Needs non-emergency MRCP once acute issues resolve; consider GI referral upon 

DC





3) IDDM


-Increasing basal for better glycemic control (has been in 200s)





4) HTN


-Continue current meds; acceptable control





5) Acute Hyponatremia


-Checking Urine and serum osm, lytes


-Asymptomatic, monitor BMP in PM tonight and treat when clinical picture 

becomes more clear.





6) E Coli + with suspect UA


-Covered by zosyn empirically based on Sn on UA obtained on admit day


-Monitor for resolution; no urinary sx.  





7) HLD


-Continue statin, followup OP





Full Code








Visit type





- Emergency Visit


Emergency Visit: No





- New Patient


This patient is new to me today: Yes


Date on this admission: 11/06/18





- Critical Care


Critical Care patient: No

## 2018-11-07 LAB
ANION GAP SERPL CALC-SCNC: 7 MMOL/L (ref 8–16)
ANION GAP SERPL CALC-SCNC: 8 MMOL/L (ref 8–16)
BASOPHILS # BLD: 0.4 % (ref 0–2)
BUN SERPL-MCNC: 13 MG/DL (ref 7–18)
BUN SERPL-MCNC: 13 MG/DL (ref 7–18)
CALCIUM SERPL-MCNC: 8.4 MG/DL (ref 8.5–10.1)
CALCIUM SERPL-MCNC: 8.7 MG/DL (ref 8.4–10.2)
CHLORIDE SERPL-SCNC: 100 MMOL/L (ref 98–107)
CHLORIDE SERPL-SCNC: 96 MMOL/L (ref 98–107)
CO2 SERPL-SCNC: 27 MMOL/L (ref 22–28)
CO2 SERPL-SCNC: 29 MMOL/L (ref 21–32)
CREAT SERPL-MCNC: 0.8 MG/DL (ref 0.55–1.3)
CREAT SERPL-MCNC: 0.9 MG/DL (ref 0.6–1.3)
DEPRECATED RDW RBC AUTO: 11.6 % (ref 11.6–15.6)
EOSINOPHIL # BLD: 1.6 % (ref 0–4.5)
ERYTHROCYTE [SEDIMENTATION RATE] IN BLOOD BY WESTERGREN METHOD: 95 MM/HR (ref 0–30)
GLUCOSE SERPL-MCNC: 309 MG/DL (ref 74–106)
GLUCOSE SERPL-MCNC: 49 MG/DL (ref 74–106)
HCT VFR BLD CALC: 38.8 % (ref 32.4–45.2)
HGB BLD-MCNC: 12.8 GM/DL (ref 10.7–15.3)
LYMPHOCYTES # BLD: 11.3 % (ref 8–40)
MCH RBC QN AUTO: 30.7 PG (ref 25.7–33.7)
MCHC RBC AUTO-ENTMCNC: 33.1 G/DL (ref 32–36)
MCV RBC: 92.9 FL (ref 80–96)
MONOCYTES # BLD AUTO: 6.7 % (ref 3.8–10.2)
NEUTROPHILS # BLD: 80 % (ref 42.8–82.8)
PLATELET # BLD AUTO: 347 K/MM3 (ref 134–434)
PMV BLD: 8.3 FL (ref 7.5–11.1)
POTASSIUM SERPLBLD-SCNC: 4.3 MMOL/L (ref 3.5–5.1)
POTASSIUM SERPLBLD-SCNC: 4.7 MMOL/L (ref 3.5–5.1)
RBC # BLD AUTO: 4.18 M/MM3 (ref 3.6–5.2)
SODIUM SERPL-SCNC: 131 MMOL/L (ref 136–145)
SODIUM SERPL-SCNC: 136 MMOL/L (ref 136–145)
WBC # BLD AUTO: 9.3 K/MM3 (ref 4–10.8)

## 2018-11-07 RX ADMIN — POLYVINYL ALCOHOL SCH: 14 SOLUTION/ DROPS OPHTHALMIC at 10:44

## 2018-11-07 RX ADMIN — INSULIN DETEMIR SCH UNITS: 100 INJECTION, SOLUTION SUBCUTANEOUS at 07:53

## 2018-11-07 RX ADMIN — LEVOTHYROXINE SODIUM SCH MCG: 112 TABLET ORAL at 07:48

## 2018-11-07 RX ADMIN — ENOXAPARIN SODIUM SCH MG: 40 INJECTION SUBCUTANEOUS at 09:58

## 2018-11-07 RX ADMIN — ASPIRIN 81 MG SCH MG: 81 TABLET ORAL at 09:57

## 2018-11-07 RX ADMIN — INSULIN ASPART SCH UNITS: 100 INJECTION, SOLUTION INTRAVENOUS; SUBCUTANEOUS at 07:54

## 2018-11-07 RX ADMIN — LOSARTAN POTASSIUM SCH MG: 50 TABLET, FILM COATED ORAL at 09:58

## 2018-11-07 RX ADMIN — DOCUSATE SODIUM SCH: 100 CAPSULE, LIQUID FILLED ORAL at 21:32

## 2018-11-07 RX ADMIN — PIPERACILLIN SODIUM,TAZOBACTAM SODIUM SCH MLS/HR: 3; .375 INJECTION, POWDER, FOR SOLUTION INTRAVENOUS at 17:12

## 2018-11-07 RX ADMIN — PANTOPRAZOLE SODIUM SCH MG: 40 TABLET, DELAYED RELEASE ORAL at 10:01

## 2018-11-07 RX ADMIN — PIPERACILLIN SODIUM,TAZOBACTAM SODIUM SCH MLS/HR: 3; .375 INJECTION, POWDER, FOR SOLUTION INTRAVENOUS at 01:37

## 2018-11-07 RX ADMIN — POLYVINYL ALCOHOL SCH DROP: 14 SOLUTION/ DROPS OPHTHALMIC at 06:54

## 2018-11-07 RX ADMIN — PIPERACILLIN SODIUM,TAZOBACTAM SODIUM SCH MLS/HR: 3; .375 INJECTION, POWDER, FOR SOLUTION INTRAVENOUS at 09:57

## 2018-11-07 RX ADMIN — POLYVINYL ALCOHOL SCH: 14 SOLUTION/ DROPS OPHTHALMIC at 10:45

## 2018-11-07 RX ADMIN — AMLODIPINE BESYLATE SCH MG: 5 TABLET ORAL at 09:59

## 2018-11-07 RX ADMIN — POLYETHYLENE GLYCOL 3350 SCH GM: 17 POWDER, FOR SOLUTION ORAL at 09:58

## 2018-11-07 RX ADMIN — Medication SCH TAB: at 15:06

## 2018-11-07 RX ADMIN — INSULIN ASPART SCH: 100 INJECTION, SOLUTION INTRAVENOUS; SUBCUTANEOUS at 16:33

## 2018-11-07 RX ADMIN — DOCUSATE SODIUM SCH: 100 CAPSULE, LIQUID FILLED ORAL at 15:06

## 2018-11-07 RX ADMIN — INSULIN ASPART SCH UNITS: 100 INJECTION, SOLUTION INTRAVENOUS; SUBCUTANEOUS at 11:47

## 2018-11-07 RX ADMIN — POLYVINYL ALCOHOL SCH DROP: 14 SOLUTION/ DROPS OPHTHALMIC at 15:06

## 2018-11-07 RX ADMIN — INSULIN ASPART SCH UNITS: 100 INJECTION, SOLUTION INTRAVENOUS; SUBCUTANEOUS at 21:32

## 2018-11-07 RX ADMIN — DOCUSATE SODIUM SCH MG: 100 CAPSULE, LIQUID FILLED ORAL at 06:54

## 2018-11-07 RX ADMIN — POLYETHYLENE GLYCOL 3350 SCH: 17 POWDER, FOR SOLUTION ORAL at 21:32

## 2018-11-07 RX ADMIN — POLYVINYL ALCOHOL SCH DROP: 14 SOLUTION/ DROPS OPHTHALMIC at 21:31

## 2018-11-07 RX ADMIN — ATORVASTATIN CALCIUM SCH MG: 20 TABLET, FILM COATED ORAL at 21:31

## 2018-11-07 NOTE — PN
Progress Note, Physician


History of Present Illness: 





C/O abdominal discomfort- points to L abdomen


No N/V     Tolerated solid diet  


Reports no  BM      


Denies fever/ chills


WBC WNL


BC no growth





- Current Medication List


Current Medications: 


Active Medications





Amlodipine Besylate (Norvasc -)  5 mg PO DAILY [facility]


   Last Admin: 11/07/18 09:59 Dose:  5 mg


Artificial Tears (Artificial Tears)  1 drop OU TID [facility]


   Last Admin: 11/07/18 10:45 Dose:  Not Given


Aspirin (Asa -)  81 mg PO DAILY [facility]


   Last Admin: 11/07/18 09:57 Dose:  81 mg


Atorvastatin Calcium (Lipitor -)  20 mg PO HS [facility]


   Last Admin: 11/06/18 21:18 Dose:  20 mg


Docusate Sodium (Colace -)  100 mg PO TID [facility]


   Last Admin: 11/07/18 06:54 Dose:  100 mg


Enoxaparin Sodium (Lovenox -)  40 mg SQ DAILY [facility]


   Last Admin: 11/07/18 09:58 Dose:  40 mg


Piperacillin Sod/Tazobactam Sod (Zosyn 3.375gm Ivpb (Pre-Docked))  3.375 gm in 

50 mls @ 100 mls/hr IVPB Q8H-IV [facility]; Protocol


   Last Admin: 11/07/18 09:57 Dose:  100 mls/hr


Insulin Aspart (Novolog Vial)  0 units SQ ACHS [facility]; Protocol


   Last Admin: 11/07/18 07:54 Dose:  6 units


Insulin Detemir (Levemir Vial)  18 units SQ ACBK [facility]


   Last Admin: 11/07/18 07:53 Dose:  18 units


Levothyroxine Sodium (Synthroid -)  112 mcg PO DAILY@0700 [facility]


   Last Admin: 11/07/18 07:48 Dose:  112 mcg


Losartan Potassium (Cozaar -)  100 mg PO DAILY [facility]


   Last Admin: 11/07/18 09:58 Dose:  100 mg


Pantoprazole Sodium (Protonix -)  40 mg PO DAILY [facility]


   Last Admin: 11/07/18 10:01 Dose:  40 mg


Polyethylene Glycol (Miralax (For Daily Use) -)  17 gm PO BID [facility]


   Last Admin: 11/07/18 09:58 Dose:  17 gm


Sodium Phosphate (Fleet Adult Rectal Enema -)  133 ml [place] ONCE ONE


   Stop: 11/07/18 11:17











- Objective


Vital Signs: 


 Vital Signs











Temperature  98.2 F   11/07/18 09:53


 


Pulse Rate  67   11/07/18 09:53


 


Respiratory Rate  18   11/07/18 09:53


 


Blood Pressure  121/60   11/07/18 09:53


 


O2 Sat by Pulse Oximetry (%)  97   11/07/18 09:00











Constitutional: Yes: No Distress


Eyes: Yes: Conjunctiva Clear


Cardiovascular: Yes: Regular Rate and Rhythm, S1, S2


Respiratory: Yes: CTA Bilaterally


Gastrointestinal: Yes: Normal Bowel Sounds, Soft, Tenderness, Other (mild L 

paraumbilical tenderness)


Edema: No


Labs: 


 CBC, BMP





 11/07/18 07:15 





 11/07/18 07:15 





 INR, PTT











INR  1.08  (0.82-1.09)   11/04/18  07:00    














Assessment/Plan





Mesenteric edema, possible abscess   ? etiology





Continue empiric coverage GI pathogens with zosyn

## 2018-11-07 NOTE — PN
Physical Exam: 


SUBJECTIVE: Patient seen and examined, reports feeling constipated,small pellet 

size stools as per patient, does report an improvement of abdominal pain








OBJECTIVE:





 Vital Signs











 Period  Temp  Pulse  Resp  BP Sys/Avendano  Pulse Ox


 


 Last 24 Hr  98.1 F-98.6 F  55-71  16-18  115-124/48-60  











GENERAL: The patient is awake, alert, and fully oriented, in no acute distress.


HEAD: Normal with no signs of trauma.


EYES: PERRL, extraocular movements intact, sclera anicteric, conjunctiva clear. 

No ptosis. 


ENT: Ears normal, nares patent, oropharynx clear without exudates, moist mucous 


membranes.


NECK: Trachea midline, full range of motion, supple. 


LUNGS: Breath sounds equal, clear to auscultation bilaterally, no wheezes, no 

crackles, no 


accessory muscle use. 


HEART: Regular rate and rhythm, S1, S2 without murmur, rub or gallop.


ABDOMEN: Soft, nontender, nondistended, normoactive bowel sounds, no guarding, 

no 


rebound, no hepatosplenomegaly, no masses.


EXTREMITIES: 2+ pulses, warm, well-perfused, no edema. 


NEUROLOGICAL: Cranial nerves II through XII grossly intact. Normal speech, gait 

not 


observed.


PSYCH: Normal mood, normal affect.


SKIN: Warm, dry, normal turgor, no rashes or lesions noted














 Laboratory Results - last 24 hr











  11/06/18 11/06/18 11/06/18





  15:40 15:40 16:42


 


WBC   


 


RBC   


 


Hgb   


 


Hct   


 


MCV   


 


MCH   


 


MCHC   


 


RDW   


 


Plt Count   


 


MPV   


 


Absolute Neuts (auto)   


 


Neutrophils %   


 


Lymphocytes %   


 


Monocytes %   


 


Eosinophils %   


 


Basophils %   


 


Sodium   


 


Potassium   


 


Chloride   


 


Carbon Dioxide   


 


Anion Gap   


 


BUN   


 


Creatinine   


 


Creat Clearance w eGFR   


 


POC Glucometer    309


 


Random Glucose   


 


Serum Osmolality  288  


 


Calcium   


 


C-Reactive Protein   2.3 H 


 


Urine Osmolality   


 


Ur Random Sodium   














  11/06/18 11/06/18 11/07/18





  20:00 21:00 01:20


 


WBC   


 


RBC   


 


Hgb   


 


Hct   


 


MCV   


 


MCH   


 


MCHC   


 


RDW   


 


Plt Count   


 


MPV   


 


Absolute Neuts (auto)   


 


Neutrophils %   


 


Lymphocytes %   


 


Monocytes %   


 


Eosinophils %   


 


Basophils %   


 


Sodium    136


 


Potassium    4.3


 


Chloride    100


 


Carbon Dioxide    29


 


Anion Gap    7 L


 


BUN    13


 


Creatinine    0.8


 


Creat Clearance w eGFR    > 60


 


POC Glucometer   358 


 


Random Glucose    49 L*


 


Serum Osmolality   


 


Calcium    8.4 L


 


C-Reactive Protein   


 


Urine Osmolality  546  


 


Ur Random Sodium  21 L  














  11/07/18 11/07/18 11/07/18





  02:37 06:17 07:15


 


WBC    9.3


 


RBC    4.18


 


Hgb    12.8


 


Hct    38.8


 


MCV    92.9


 


MCH    30.7


 


MCHC    33.1


 


RDW    11.6


 


Plt Count    347


 


MPV    8.3


 


Absolute Neuts (auto)    7.6


 


Neutrophils %    80.0


 


Lymphocytes %    11.3


 


Monocytes %    6.7


 


Eosinophils %    1.6


 


Basophils %    0.4


 


Sodium   


 


Potassium   


 


Chloride   


 


Carbon Dioxide   


 


Anion Gap   


 


BUN   


 


Creatinine   


 


Creat Clearance w eGFR   


 


POC Glucometer  80  296 


 


Random Glucose   


 


Serum Osmolality   


 


Calcium   


 


C-Reactive Protein   


 


Urine Osmolality   


 


Ur Random Sodium   














  11/07/18





  07:15


 


WBC 


 


RBC 


 


Hgb 


 


Hct 


 


MCV 


 


MCH 


 


MCHC 


 


RDW 


 


Plt Count 


 


MPV 


 


Absolute Neuts (auto) 


 


Neutrophils % 


 


Lymphocytes % 


 


Monocytes % 


 


Eosinophils % 


 


Basophils % 


 


Sodium  131 L


 


Potassium  4.7


 


Chloride  96 L


 


Carbon Dioxide  27


 


Anion Gap  8


 


BUN  13


 


Creatinine  0.9


 


Creat Clearance w eGFR  > 60


 


POC Glucometer 


 


Random Glucose  309 H* D


 


Serum Osmolality 


 


Calcium  8.7


 


C-Reactive Protein 


 


Urine Osmolality 


 


Ur Random Sodium 








Active Medications











Generic Name Dose Route Start Last Admin





  Trade Name July  PRN Reason Stop Dose Admin


 


Amlodipine Besylate  5 mg  11/03/18 14:45  11/07/18 09:59





  Norvasc -  PO   5 mg





  DAILY MELINA   Administration





     





     





     





     


 


Artificial Tears  1 drop  11/04/18 22:00  11/07/18 10:45





  Artificial Tears  OU   Not Given





  TID MELINA   





     





     





     





     


 


Aspirin  81 mg  11/04/18 10:00  11/07/18 09:57





  Asa -  PO   81 mg





  DAILY MELINA   Administration





     





     





     





     


 


Atorvastatin Calcium  20 mg  11/04/18 22:00  11/06/18 21:18





  Lipitor -  PO   20 mg





  HS MELINA   Administration





     





     





     





     


 


Docusate Sodium  100 mg  11/06/18 06:00  11/07/18 06:54





  Colace -  PO   100 mg





  TID MELINA   Administration





     





     





     





     


 


Enoxaparin Sodium  40 mg  11/04/18 10:00  11/07/18 09:58





  Lovenox -  SQ   40 mg





  DAILY MELINA   Administration





     





     





     





     


 


Piperacillin Sod/Tazobactam Sod  3.375 gm in 50 mls @ 100 mls/hr  11/07/18 02:

00  11/07/18 09:57





  Zosyn 3.375gm Ivpb (Pre-Docked)  IVPB   100 mls/hr





  Q8H-IV MELINA   Administration





     





     





  Protocol   





     


 


Insulin Aspart  0 units  11/03/18 16:30  11/07/18 07:54





  Novolog Vial  SQ   6 units





  ACHS MELINA   Administration





     





     





  Protocol   





     


 


Insulin Detemir  18 units  11/07/18 07:00  11/07/18 07:53





  Levemir Vial  SQ   18 units





  ACBK MELINA   Administration





     





     





     





     


 


Levothyroxine Sodium  112 mcg  11/05/18 07:00  11/07/18 07:48





  Synthroid -  PO   112 mcg





  DAILY@0700 MELINA   Administration





     





     





     





     


 


Losartan Potassium  100 mg  11/05/18 10:00  11/07/18 09:58





  Cozaar -  PO   100 mg





  DAILY MELINA   Administration





     





     





     





     


 


Pantoprazole Sodium  40 mg  11/05/18 10:00  11/07/18 10:01





  Protonix -  PO   40 mg





  DAILY MELINA   Administration





     





     





     





     


 


Polyethylene Glycol  17 gm  11/06/18 10:00  11/07/18 09:58





  Miralax (For Daily Use) -  PO   17 gm





  BID MELINA   Administration





     





     





     





     





 Microbiology





11/03/18 15:05   Blood - Peripheral Venous   Blood Culture - Preliminary


                            NO GROWTH OBTAINED AFTER 72 HOURS, INCUBATION TO 

CONTINUE


                            FOR 2 DAYS.


11/03/18 14:50   Blood - Peripheral Venous   Blood Culture - Preliminary


                            NO GROWTH OBTAINED AFTER 72 HOURS, INCUBATION TO 

CONTINUE


                            FOR 2 DAYS.


11/02/18 16:10   Urine - Urine Clean Catch   Urine Culture - Final


                            Escherichia Coli











ASSESSMENT/PLAN:


Sr Orquidea Canchola is a 76 y/o female, admitted from the emergency 

department for possible abdominal abscess.  





1) abdominal abscess


- reviewed with IR, Dr Yuan, abscess are small unable to drain, will repeat 

ct abd/pelvis with Iv/PO contrast scheduled for tomm morning


-ID following; Zosyn day #5; afebrile without any new white count.


-Continue to monitor for fevers, monitor CBC. 





2) Pancreatic Ductal Dilation (main duct)


-Needs non-emergency MRCP once acute issues resolve; consider GI referral upon 

DC





3) IDDM


- continue novolog SS and levermir, will restart Januvia  





4) HTN


-Continue current meds; acceptable control





5) Acute Hyponatremia


- serum sodium corrected in setting of hyperglycemia 136, close monitoring 








6) E Coli + with suspect UA


-Covered by zosyn empirically based on Sn on UA obtained on admit day


-Monitor for resolution; no urinary sx.  





7) HLD


-Continue statin, followup OP





Full Code








Visit type





- Emergency Visit


Emergency Visit: Yes


ED Registration Date: 11/02/18


Care time: The patient presented to the Emergency Department on the above date 

and was hospitalized for further evaluation of their emergent condition.





- New Patient


This patient is new to me today: No





- Critical Care


Critical Care patient: No





- Discharge Referral


Referred to [facility] No

## 2018-11-08 LAB
ANION GAP SERPL CALC-SCNC: 5 MMOL/L (ref 8–16)
BASOPHILS # BLD: 0.6 % (ref 0–2)
BUN SERPL-MCNC: 11 MG/DL (ref 7–18)
CALCIUM SERPL-MCNC: 8.1 MG/DL (ref 8.4–10.2)
CHLORIDE SERPL-SCNC: 101 MMOL/L (ref 98–107)
CO2 SERPL-SCNC: 26 MMOL/L (ref 22–28)
CREAT SERPL-MCNC: 0.9 MG/DL (ref 0.6–1.3)
DEPRECATED RDW RBC AUTO: 11.5 % (ref 11.6–15.6)
EOSINOPHIL # BLD: 2.4 % (ref 0–4.5)
GLUCOSE SERPL-MCNC: 189 MG/DL (ref 74–106)
HCT VFR BLD CALC: 33.5 % (ref 32.4–45.2)
HGB BLD-MCNC: 11.6 GM/DL (ref 10.7–15.3)
LYMPHOCYTES # BLD: 11.8 % (ref 8–40)
MAGNESIUM SERPL-MCNC: 1.9 MG/DL (ref 1.8–2.4)
MCH RBC QN AUTO: 32.4 PG (ref 25.7–33.7)
MCHC RBC AUTO-ENTMCNC: 34.7 G/DL (ref 32–36)
MCV RBC: 93.3 FL (ref 80–96)
MONOCYTES # BLD AUTO: 7.6 % (ref 3.8–10.2)
NEUTROPHILS # BLD: 77.6 % (ref 42.8–82.8)
PHOSPHATE SERPL-MCNC: 3.2 MG/DL (ref 2.5–4.6)
PLATELET # BLD AUTO: 266 K/MM3 (ref 134–434)
PMV BLD: 7.9 FL (ref 7.5–11.1)
POTASSIUM SERPLBLD-SCNC: 4.4 MMOL/L (ref 3.5–5.1)
RBC # BLD AUTO: 3.59 M/MM3 (ref 3.6–5.2)
SODIUM SERPL-SCNC: 132 MMOL/L (ref 136–145)
WBC # BLD AUTO: 6.9 K/MM3 (ref 4–10.8)

## 2018-11-08 RX ADMIN — PIPERACILLIN SODIUM,TAZOBACTAM SODIUM SCH MLS/HR: 3; .375 INJECTION, POWDER, FOR SOLUTION INTRAVENOUS at 17:38

## 2018-11-08 RX ADMIN — INSULIN ASPART SCH UNITS: 100 INJECTION, SOLUTION INTRAVENOUS; SUBCUTANEOUS at 16:38

## 2018-11-08 RX ADMIN — ENOXAPARIN SODIUM SCH MG: 40 INJECTION SUBCUTANEOUS at 10:10

## 2018-11-08 RX ADMIN — POLYVINYL ALCOHOL SCH DROP: 14 SOLUTION/ DROPS OPHTHALMIC at 06:35

## 2018-11-08 RX ADMIN — PANTOPRAZOLE SODIUM SCH MG: 40 TABLET, DELAYED RELEASE ORAL at 10:10

## 2018-11-08 RX ADMIN — INSULIN DETEMIR SCH: 100 INJECTION, SOLUTION SUBCUTANEOUS at 08:24

## 2018-11-08 RX ADMIN — PIPERACILLIN SODIUM,TAZOBACTAM SODIUM SCH MLS/HR: 3; .375 INJECTION, POWDER, FOR SOLUTION INTRAVENOUS at 01:37

## 2018-11-08 RX ADMIN — ATORVASTATIN CALCIUM SCH MG: 20 TABLET, FILM COATED ORAL at 21:50

## 2018-11-08 RX ADMIN — POLYVINYL ALCOHOL SCH DROP: 14 SOLUTION/ DROPS OPHTHALMIC at 13:55

## 2018-11-08 RX ADMIN — LEVOTHYROXINE SODIUM SCH MCG: 112 TABLET ORAL at 06:35

## 2018-11-08 RX ADMIN — DOCUSATE SODIUM SCH: 100 CAPSULE, LIQUID FILLED ORAL at 06:35

## 2018-11-08 RX ADMIN — INSULIN ASPART SCH: 100 INJECTION, SOLUTION INTRAVENOUS; SUBCUTANEOUS at 06:48

## 2018-11-08 RX ADMIN — Medication SCH TAB: at 10:10

## 2018-11-08 RX ADMIN — AMLODIPINE BESYLATE SCH MG: 5 TABLET ORAL at 10:10

## 2018-11-08 RX ADMIN — INSULIN ASPART SCH UNITS: 100 INJECTION, SOLUTION INTRAVENOUS; SUBCUTANEOUS at 12:00

## 2018-11-08 RX ADMIN — POLYETHYLENE GLYCOL 3350 SCH GM: 17 POWDER, FOR SOLUTION ORAL at 10:10

## 2018-11-08 RX ADMIN — LOSARTAN POTASSIUM SCH MG: 50 TABLET, FILM COATED ORAL at 10:10

## 2018-11-08 RX ADMIN — PIPERACILLIN SODIUM,TAZOBACTAM SODIUM SCH MLS/HR: 3; .375 INJECTION, POWDER, FOR SOLUTION INTRAVENOUS at 10:10

## 2018-11-08 RX ADMIN — ASPIRIN 81 MG SCH MG: 81 TABLET ORAL at 10:10

## 2018-11-08 RX ADMIN — POLYETHYLENE GLYCOL 3350 SCH: 17 POWDER, FOR SOLUTION ORAL at 21:50

## 2018-11-08 RX ADMIN — DOCUSATE SODIUM SCH: 100 CAPSULE, LIQUID FILLED ORAL at 21:50

## 2018-11-08 RX ADMIN — POLYVINYL ALCOHOL SCH DROP: 14 SOLUTION/ DROPS OPHTHALMIC at 21:50

## 2018-11-08 RX ADMIN — DOCUSATE SODIUM SCH: 100 CAPSULE, LIQUID FILLED ORAL at 13:55

## 2018-11-08 NOTE — PN
Physical Exam: 


SUBJECTIVE: Patient seen and examined, patient reports feeling well, denies 

abdominal pain, had large bowel movement last evening.  








OBJECTIVE:Patient is a 77 year-old female with a PMH significant for HTN, HLD, 

Type II diabetes insulin dependent, hypothyroidism, gastritis, and sigmoid 

diverticulosis. patient was admitted from the emergency department for 

mesenteric abscess.  








 Vital Signs











 Period  Temp  Pulse  Resp  BP Sys/Avendano  Pulse Ox


 


 Last 24 Hr  97.5 F-98.2 F  56-69  16-18  121-165/48-71  











GENERAL: The patient is awake, alert, and fully oriented, in no acute distress.


HEAD: Normal with no signs of trauma.


EYES: PERRL, extraocular movements intact, sclera anicteric, conjunctiva clear. 

No ptosis. 


ENT: Ears normal, nares patent, oropharynx clear without exudates, moist mucous 


membranes.


NECK: Trachea midline, full range of motion, supple. 


LUNGS: Breath sounds equal, clear to auscultation bilaterally, no wheezes, no 

crackles, no 


accessory muscle use. 


HEART: Regular rate and rhythm, S1, S2 without murmur, rub or gallop.


ABDOMEN: Soft, nontender, nondistended, normoactive bowel sounds, no guarding, 

no 


rebound, no hepatosplenomegaly, no masses.


EXTREMITIES: 2+ pulses, warm, well-perfused, no edema. 


NEUROLOGICAL: Cranial nerves II through XII grossly intact. Normal speech, gait 

not 


observed.


PSYCH: Normal mood, normal affect.


SKIN: Warm, dry, normal turgor, no rashes or lesions noted














 Laboratory Results - last 24 hr











  11/07/18 11/07/18 11/08/18





  16:21 21:25 06:37


 


WBC   


 


RBC   


 


Hgb   


 


Hct   


 


MCV   


 


MCH   


 


MCHC   


 


RDW   


 


Plt Count   


 


MPV   


 


Absolute Neuts (auto)   


 


Neutrophils %   


 


Lymphocytes %   


 


Monocytes %   


 


Eosinophils %   


 


Basophils %   


 


Sodium   


 


Potassium   


 


Chloride   


 


Carbon Dioxide   


 


Anion Gap   


 


BUN   


 


Creatinine   


 


Creat Clearance w eGFR   


 


POC Glucometer  96  237  140


 


Random Glucose   


 


Calcium   


 


Phosphorus   


 


Magnesium   














  11/08/18 11/08/18 11/08/18





  07:37 07:37 11:40


 


WBC  6.9  


 


RBC  3.59 L  


 


Hgb  11.6  


 


Hct  33.5  


 


MCV  93.3  


 


MCH  32.4  


 


MCHC  34.7  


 


RDW  11.5 L  


 


Plt Count  266  


 


MPV  7.9  


 


Absolute Neuts (auto)  5.4  


 


Neutrophils %  77.6  


 


Lymphocytes %  11.8  


 


Monocytes %  7.6  


 


Eosinophils %  2.4  


 


Basophils %  0.6  


 


Sodium   132 L 


 


Potassium   4.4 


 


Chloride   101 


 


Carbon Dioxide   26 


 


Anion Gap   5 L 


 


BUN   11 


 


Creatinine   0.9 


 


Creat Clearance w eGFR   > 60 


 


POC Glucometer    177


 


Random Glucose   189 H D 


 


Calcium   8.1 L 


 


Phosphorus   3.2  D 


 


Magnesium   1.9 








Active Medications











Generic Name Dose Route Start Last Admin





  Trade Name Freq  PRN Reason Stop Dose Admin


 


Amlodipine Besylate  5 mg  11/03/18 14:45  11/08/18 10:10





  Norvasc -  PO   5 mg





  DAILY [facility]   Administration





     





     





     





     


 


Artificial Tears  1 drop  11/04/18 22:00  11/08/18 06:35





  Artificial Tears  OU   1 drop





  TID [facility]   Administration





     





     





     





     


 


Aspirin  81 mg  11/04/18 10:00  11/08/18 10:10





  Asa -  PO   81 mg





  DAILY [facility]   Administration





     





     





     





     


 


Atorvastatin Calcium  20 mg  11/04/18 22:00  11/07/18 21:31





  Lipitor -  PO   20 mg





  HS [facility]   Administration





     





     





     





     


 


Docusate Sodium  100 mg  11/06/18 06:00  11/08/18 06:35





  Colace -  PO   Not Given





  TID [facility]   





     





     





     





     


 


Enoxaparin Sodium  40 mg  11/04/18 10:00  11/08/18 10:10





  Lovenox -  SQ   40 mg





  DAILY [facility]   Administration





     





     





     





     


 


Piperacillin Sod/Tazobactam  50 mls @ 100 mls/hr  11/08/18 18:00  





  Sod 3.375 gm/ Sodium Chloride  IVPB   





  Q8H-IV [facility]   





     





     





  Protocol   





     


 


Insulin Aspart  0 units  11/03/18 16:30  11/08/18 12:00





  Novolog Vial  SQ   2 units





  ACHS [facility]   Administration





     





     





  Protocol   





     


 


Insulin Detemir  18 units  11/07/18 07:00  11/08/18 08:24





  Levemir Vial  SQ   Not Given





  ACBK [facility]   





     





     





     





     


 


Lactobacillus Acidophilus  1 tab  11/07/18 14:00  11/08/18 10:10





  Bacid -  PO   1 tab





  DAILY [facility]   Administration





     





     





     





     


 


Levothyroxine Sodium  112 mcg  11/05/18 07:00  11/08/18 06:35





  Synthroid -  PO   112 mcg





  DAILY@0700 [facility]   Administration





     





     





     





     


 


Losartan Potassium  100 mg  11/05/18 10:00  11/08/18 10:10





  Cozaar -  PO   100 mg





  DAILY MELINA   Administration





     





     





     





     


 


Melatonin  5 mg  11/07/18 13:55  





  Melatonin  PO   





  HS PRN   





  INSOMNIA   





     





     





     


 


Pantoprazole Sodium  40 mg  11/05/18 10:00  11/08/18 10:10





  Protonix -  PO   40 mg





  DAILY MELINA   Administration





     





     





     





     


 


Polyethylene Glycol  17 gm  11/06/18 10:00  11/08/18 10:10





  Miralax (For Daily Use) -  PO   17 gm





  BID MELINA   Administration





     





     





     





     


 


Sitagliptin Phosphate  100 mg  11/08/18 07:00  11/08/18 06:35





  Januvia -  PO   100 mg





  ACBK MELINA   Administration





     





     





     





     








 Microbiology





11/03/18 15:05   Blood - Peripheral Venous   Blood Culture - Preliminary


                            NO GROWTH OBTAINED AFTER 96 HOURS, INCUBATION TO 

CONTINUE


                            FOR 1 DAYS.


11/03/18 14:50   Blood - Peripheral Venous   Blood Culture - Preliminary


                            NO GROWTH OBTAINED AFTER 96 HOURS, INCUBATION TO 

CONTINUE


                            FOR 1 DAYS.


11/02/18 16:10   Urine - Urine Clean Catch   Urine Culture - Final


                            Escherichia Coli








ASSESSMENT/PLAN:


Sr Orquidea Canchola is a 78 y/o female, admitted from the emergency 

department for possible abdominal abscess.  





1) abdominal abscess


- reviewed with IR, Dr Yuan, abscess are small unable to drain, repeat CT 

scan of abdomen and pelvis will repeat ct abd/pelvis with Iv/PO contrast no 

significant change, area of mesenteric edema noted with possible abscess 

formation within the left lower abdomen, right ovarian cysts 2.4 x 2.2 x 2.7 cm 

noted, questionable metastatic process, CEA 19 and CEA ordered, appreciate the 

input of general surgeon 


-ID following; Zosyn day #6; afebrile without any new white count.


-Continue to monitor for fevers, monitor CBC. 





2) Pancreatic Ductal Dilation (main duct)


-Needs non-emergency MRCP once acute issues resolve; consider GI referral upon 

DC





3) IDDM


- continue novolog SS and levermir,  restart Januvia  





4) HTN


-Continue current meds; acceptable control





5) Acute Hyponatremia


- serum sodium corrected in setting of hyperglycemia 136, close monitoring 








6) E Coli + with suspect UA


-Covered by zosyn empirically based on Sn on UA obtained on admit day


-Monitor for resolution; no urinary sx.  





7) HLD


-Continue statin, followup OP





Full Code








Visit type





- Emergency Visit


Emergency Visit: Yes


ED Registration Date: 11/02/18


Care time: The patient presented to the Emergency Department on the above date 

and was hospitalized for further evaluation of their emergent condition.





- New Patient


This patient is new to me today: No





- Critical Care


Critical Care patient: No





- Discharge Referral


Referred to [facility] No

## 2018-11-08 NOTE — PN
Progress Note, Physician


History of Present Illness: 


OOB in chair


C/O L sided abdominal discomfort but improved


No N/V     Tolerating solid diet  


Reports + loose BM yesterday     


Denies fever/ chills


WBC WNL


BC no growth





- Current Medication List


Current Medications: 


Active Medications





Amlodipine Besylate (Norvasc -)  5 mg PO DAILY [facility]


   Last Admin: 11/08/18 10:10 Dose:  5 mg


Artificial Tears (Artificial Tears)  1 drop OU TID [facility]


   Last Admin: 11/08/18 06:35 Dose:  1 drop


Aspirin (Asa -)  81 mg PO DAILY [facility]


   Last Admin: 11/08/18 10:10 Dose:  81 mg


Atorvastatin Calcium (Lipitor -)  20 mg PO HS [facility]


   Last Admin: 11/07/18 21:31 Dose:  20 mg


Docusate Sodium (Colace -)  100 mg PO TID [facility]


   Last Admin: 11/08/18 06:35 Dose:  Not Given


Enoxaparin Sodium (Lovenox -)  40 mg SQ DAILY [facility]


   Last Admin: 11/08/18 10:10 Dose:  40 mg


Piperacillin Sod/Tazobactam (Sod 3.375 gm/ Sodium Chloride)  50 mls @ 100 mls/

hr IVPB Q8H-IV [facility]; Protocol


Insulin Aspart (Novolog Vial)  0 units SQ ACHS [facility]; Protocol


   Last Admin: 11/08/18 12:00 Dose:  2 units


Insulin Detemir (Levemir Vial)  18 units SQ BK [facility]


   Last Admin: 11/08/18 08:24 Dose:  Not Given


Lactobacillus Acidophilus (Bacid -)  1 tab PO DAILY [facility]


   Last Admin: 11/08/18 10:10 Dose:  1 tab


Levothyroxine Sodium (Synthroid -)  112 mcg PO DAILY@0700 [facility]


   Last Admin: 11/08/18 06:35 Dose:  112 mcg


Losartan Potassium (Cozaar -)  100 mg PO DAILY [facility]


   Last Admin: 11/08/18 10:10 Dose:  100 mg


Melatonin (Melatonin)  5 mg PO HS PRN


   PRN Reason: INSOMNIA


Pantoprazole Sodium (Protonix -)  40 mg PO DAILY [facility]


   Last Admin: 11/08/18 10:10 Dose:  40 mg


Polyethylene Glycol (Miralax (For Daily Use) -)  17 gm PO BID [facility]


   Last Admin: 11/08/18 10:10 Dose:  17 gm


Sitagliptin Phosphate (Januvia -)  100 mg PO ACBK [facility]


   Last Admin: 11/08/18 06:35 Dose:  100 mg











- Objective


Vital Signs: 


 Vital Signs











Temperature  97.5 F L  11/08/18 10:00


 


Pulse Rate  56 L  11/08/18 10:00


 


Respiratory Rate  17   11/08/18 10:00


 


Blood Pressure  165/71   11/08/18 10:00


 


O2 Sat by Pulse Oximetry (%)  100   11/08/18 10:00











Constitutional: Yes: No Distress


Cardiovascular: Yes: Regular Rate and Rhythm, S1, S2


Respiratory: Yes: CTA Bilaterally


Gastrointestinal: Yes: Normal Bowel Sounds, Soft, Tenderness (+ L sided 

abdominal tenderness)


Edema: No


Labs: 


 CBC, BMP





 11/08/18 07:37 





 11/08/18 07:37 





 INR, PTT











INR  1.08  (0.82-1.09)   11/04/18  07:00    














Assessment/Plan





Mesenteric edema, possible abscess   ? etiology


   Follow up CT unchanged after approx 1w empiric antibiotic tx


   Now with ? new perinephric collection


   Not sure if this is a GI or  process


Advise surgical evaluation


? MRI

## 2018-11-08 NOTE — CONSULT
- Consultation


REQUESTING PROVIDER: Ayesha Field NP





CONSULT REQUEST: We have been asked to surgically evaluate this patient for 

evaluation and management of abdomnal pain and abnormal imaging findings. 

Patient presented 1 week ago w/LLQ pain and saw here PCP who sent her to the ER

; CT scan of the a/p revealed a possible mesenteric abscess and edema; she has 

known mild diverticulosis on colonoscopy # years ago; she denies GI//GYN c/o o

/w; she has had no n/v; she is tolertaing a regular diet and moving her bowels. 

She is ; mammography is up to date.





PCP:Ayesha Field





HISTORY OF PRESENT ILLNESS:





PMHx: IDDM; hypertension; hyperlipidemia         





PSHx:  shoulder and foot surgery    


 Home Medications











 Medication  Instructions  Recorded


 


Pravastatin Sodium [Pravachol -] 80 mg PO HS 13


 


metFORMIN HCL [Glucophage] 1,000 mg PO DAILY 13


 


Aspirin [ASA -] 81 mg PO DAILY #0 tab.chew 13


 


Levothyroxine [Synthroid -] 112 mcg PO DAILY@0700 #0 tablet 13


 


Losartan Potassium [Cozaar -] 100 mg PO DAILY 13


 


Amlodipine Besylate [Norvasc -] 5 mg PO DAILY 06/13/15


 


Insulin Detemir [Levemir Flextouch] 16 unit SQ DAILY #1 ml 17


 


Insulin Lispro [Humalog] 5 unit SQ TID 18


 


Metformin HCl [Metformin HCl ER] 500 mg PO DAILY 18


 


Propylene Glycol/Peg 400/Pf 1 each OU TID 18





[Systane 0.3-0.4% Eye Drops]  


 


Sitagliptin Phosphate [Januvia] 100 mg PO DAILY 18








 Allergies











Allergy/AdvReac Type Severity Reaction Status Date / Time


 


No Known Allergies Allergy   Verified 18 15:33














PHYSICAL EXAM:


GENERAL: Awake, alert, and fully oriented, in no acute distress.


HEAD: Normal with no signs of trauma.


EYES: PERRL, sclera anicteric, conjunctiva clear.


NECK: Normal ROM, supple without lymphadenopathy, JVD, or masses.


ABDOMEN: Soft, nontender, not distended, normoactive bowel sounds, no guarding, 

no rebound, no masses.  No organomegaly. No hernias.


MUSCULOSKELETAL: Normal ROM at all joints. No bony deformities or tenderness. 

No CVA tenderness.


UPPER EXTREMITIES: 2+ pulses, warm, well-perfused. No cyanosis. Cap refill <2 

seconds. No peripheral edema.


LOWER EXTREMITIES: 2+ pulses, warm, well-perfused. No calf tenderness. No 

peripheral edema. 


NEUROLOGICAL: Normal speech, gait not observed.


PSYCH: Cooperative. Good eye contact. Appropriate mood and affect.


SKIN: Warm, dry, normal turgor, no rashes or lesions noted.


 Vital Signs











Temperature  97.5 F L  18 14:13


 


Pulse Rate  72   18 14:13


 


Respiratory Rate  16   18 14:13


 


Blood Pressure  117/50 L  18 14:13


 


O2 Sat by Pulse Oximetry (%)  100   18 14:13








 Lab Results











WBC  6.9 K/mm3 (4.0-10.8)   18  07:37    


 


RBC  3.59 M/mm3 (3.60-5.2)  L  18  07:37    


 


Hgb  11.6 GM/dl (10.7-15.3)   18  07:37    


 


Hct  33.5 % (32.4-45.2)   18  07:37    


 


MCV  93.3 fl (80-96)   18  07:37    


 


MCHC  34.7 g/dl (32.0-36.0)   18  07:37    


 


RDW  11.5 % (11.6-15.6)  L  18  07:37    


 


Plt Count  266 K/MM3 (134-434)   18  07:37    


 


Sodium  132 mmol/L (136-145)  L  18  07:37    


 


Potassium  4.4 mmol/L (3.5-5.1)   18  07:37    


 


Chloride  101 mmol/L ()   18  07:37    


 


Carbon Dioxide  26 mmol/L (22-28)   18  07:37    


 


Anion Gap  5 MMOL/L (8-16)  L  18  07:37    


 


BUN  11 mg/dl (7-18)   18  07:37    


 


Creatinine  0.9 mg/dl (0.6-1.3)   18  07:37    


 


Random Glucose  189 mg/dl ()  H D 18  07:37    


 


Calcium  8.1 mg/dl (8.4-10.2)  L  18  07:37    


 


INR  1.08  (0.82-1.09)   18  07:00    








Imaging w/u to date reviewed.





IMP: ? mesenteric collection of ? origin and abnormalities of ovarie(s) and 

perinephric areas; no evidence of an acute surgical abdomen.





PLAN: Suggest GYN evaluation/tumor markers and continuing present plan w/ f/u 

outpatient imaging; origin of the radiological abnormalities remains obscure 

and requires f/u imaging.





Jorden Potter MD FACS

## 2018-11-09 VITALS — HEART RATE: 58 BPM | TEMPERATURE: 97.5 F | DIASTOLIC BLOOD PRESSURE: 55 MMHG | SYSTOLIC BLOOD PRESSURE: 124 MMHG

## 2018-11-09 RX ADMIN — POLYVINYL ALCOHOL SCH DROP: 14 SOLUTION/ DROPS OPHTHALMIC at 06:07

## 2018-11-09 RX ADMIN — INSULIN ASPART SCH UNITS: 100 INJECTION, SOLUTION INTRAVENOUS; SUBCUTANEOUS at 08:05

## 2018-11-09 RX ADMIN — PIPERACILLIN SODIUM,TAZOBACTAM SODIUM SCH MLS/HR: 3; .375 INJECTION, POWDER, FOR SOLUTION INTRAVENOUS at 02:00

## 2018-11-09 RX ADMIN — LOSARTAN POTASSIUM SCH MG: 50 TABLET, FILM COATED ORAL at 09:47

## 2018-11-09 RX ADMIN — ASPIRIN 81 MG SCH MG: 81 TABLET ORAL at 09:47

## 2018-11-09 RX ADMIN — INSULIN ASPART SCH UNITS: 100 INJECTION, SOLUTION INTRAVENOUS; SUBCUTANEOUS at 12:08

## 2018-11-09 RX ADMIN — ENOXAPARIN SODIUM SCH MG: 40 INJECTION SUBCUTANEOUS at 09:46

## 2018-11-09 RX ADMIN — AMLODIPINE BESYLATE SCH MG: 5 TABLET ORAL at 09:47

## 2018-11-09 RX ADMIN — INSULIN DETEMIR SCH UNITS: 100 INJECTION, SOLUTION SUBCUTANEOUS at 08:12

## 2018-11-09 RX ADMIN — DOCUSATE SODIUM SCH: 100 CAPSULE, LIQUID FILLED ORAL at 06:06

## 2018-11-09 RX ADMIN — POLYETHYLENE GLYCOL 3350 SCH: 17 POWDER, FOR SOLUTION ORAL at 09:47

## 2018-11-09 RX ADMIN — PIPERACILLIN SODIUM,TAZOBACTAM SODIUM SCH MLS/HR: 3; .375 INJECTION, POWDER, FOR SOLUTION INTRAVENOUS at 09:48

## 2018-11-09 RX ADMIN — PANTOPRAZOLE SODIUM SCH MG: 40 TABLET, DELAYED RELEASE ORAL at 09:47

## 2018-11-09 RX ADMIN — LEVOTHYROXINE SODIUM SCH MCG: 112 TABLET ORAL at 06:06

## 2018-11-09 RX ADMIN — Medication SCH TAB: at 09:47

## 2018-11-09 NOTE — DS
Physical Exam: 


SUBJECTIVE: Patient seen and examined, patient is ambulatory at bedside, steady 

gait is noted, she is tolerating regular diet, denies any tactile fever, denies 

any abdominal pain.  ready for discharge home. 





OBJECTIVE: Sr Orquidea Canchola is a 77 year-old female with a PMH significant 

for HTN, HLD, Type II diabetes insulin dependent, hypothyroidism, gastritis, 

and sigmoid diverticulosis. A week ago patient developed pain in the LLQ, nausea

, and vomiting. The nausea and vomiting resolved the next day, but the LLQ pain 

persisted. The pain was not better or worse with food, but PO intake dropped 

due to discomfort. Yesterday patient went to see her PCP Dr. Blair who 

referred patient to the ED. Patient denies fever, sweats, chills. Denies dysuria

, frequency, urgency. Denies diarrhea. No history of abdominal surgeries.





 Vital Signs











 Period  Temp  Pulse  Resp  BP Sys/Avendano  Pulse Ox


 


 Last 24 Hr  97.5 F-98.4 F  61-76  16-18  101-125/43-53  








PHYSICAL EXAM





GENERAL: The patient is awake, alert, and fully oriented, in no acute distress.


HEAD: Normal with no signs of trauma.


EYES: PERRL, extraocular movements intact, sclera anicteric, conjunctiva clear. 


ENT: Ears normal, nares patent, oropharynx clear without exudates, moist mucous 

membranes.


NECK: Trachea midline, full range of motion, supple. 


LUNGS: Breath sounds equal, clear to auscultation bilaterally, no wheezes, no 

crackles, no accessory muscle use. 


HEART: Regular rate and rhythm, S1, S2 without murmur, rub or gallop.


ABDOMEN: Soft, nontender, nondistended, normoactive bowel sounds, no guarding, 

no rebound, no hepatosplenomegaly, no masses.


EXTREMITIES: 2+ pulses, warm, well-perfused, no edema. 


NEUROLOGICAL: Cranial nerves II through XII grossly intact. Normal speech, gait 

not observed.


PSYCH: Normal mood, normal affect.


SKIN: Warm, dry, normal turgor, no rashes or lesions noted.





LABS


 Laboratory Results - last 24 hr











  11/08/18 11/08/18 11/09/18





  16:34 22:00 06:02


 


POC Glucometer  277  304  272














  11/09/18





  12:06


 


POC Glucometer  335





 Microbiology





11/03/18 15:05   Blood - Peripheral Venous   Blood Culture - Final


                            NO GROWTH AFTER 5 DAYS INCUBATION


11/03/18 14:50   Blood - Peripheral Venous   Blood Culture - Final


                            NO GROWTH AFTER 5 DAYS INCUBATION


11/02/18 16:10   Urine - Urine Clean Catch   Urine Culture - Final


                            Escherichia Coli





IMAGING


-11/2 CTAP w/contrast: focal mesenteric edema within left paramedian aspect of 

lower abdomen/upper pelvis with a 3.3 x 2.3cm ring-enhancing fluid collection 

possible abscess


-11/7 repeat ct abd/pelvis with Iv/PO contrast no significant change, area of 

mesenteric edema noted with possible abscess formation within the left lower 

abdomen, right ovarian cysts 2.4 x 2.2 x 2.7 cm noted,





HOSPITAL COURSE:


1) abdominal abscess


- reviewed with IR, Dr Yuan, reviewed repeat ct scan of abd/pelvis, abscess 

are small unable to drain,


- CEA 19 and CEA are pending 


- consulted general surgeon Dr Potter no surgical intervention at this time


- case discussed with urologist, Dr Suggs, patient will require 

outpatient follow up 


-ID followed, Dr Jamison,  Denzel day #7; afebrile without any new white count.








2) Pancreatic Ductal Dilation (main duct)


-Needs non-emergency MRCP once acute issues resolve; r GI referral upon DC





3) IDDM


- continue novolog SS and levermir,  with home medication  Januvia  





4) HTN


-Continue current meds; acceptable control





5) Acute Hyponatremia


- serum sodium corrected in setting of hyperglycemia 136








6) E Coli + with suspect UA


-Covered by zosyn empirically based on Sn on UA obtained on admit day


- no urinary sx.  





7) HLD


-Continue statin, followup OP





PLAN


- discharge home with augumentin 875mg bid for 10 days


- strict follow up with Dr Blair (pcp) and urology within 1 week


- follow up with Dr Griffin patient's private GI within 2 weeks


- reviewed precautions reviewed with patient at length and patient's friend Sr Henriquez, both patient and Sr Henriquez verbalize understanding 


Date of Admission:11/02/18





Date of Discharge: 11/09/18








, pa


Minutes to complete discharge: 45





Discharge Summary


Reason For Visit: DIARRHEA/SM BOWEL ABSCESS


Current Active Problems





Diabetes (Acute)


Diarrhea (Acute)


Intra-abdominal abscess (Acute)


Small bowel edema (Acute)








Condition: Stable





- Instructions


Diet, Activity, Other Instructions: 


You were admitted to the hospital for the intra-abdominal abscess and a urinary 

tract infection





You were treated with IV antibiotics during your admission in the hospital





Please continue Augmentin (antibiotic) daily as prescribed





Please follow-up with your primary care physician within one week. Please follow

-up with the urologist within one week





If any new or persistent symptoms develop please return to the emergency 

department





Referrals: 


Yaya Blair MD [Primary Care Provider] - 1 Week


Jairo Griffin MD [Staff Physician] - 2 Weeks


Antonio Garza MD [Staff Physician] - 1 Week


Disposition: HOME





- Home Medications


Comprehensive Discharge Medication List: 


Ambulatory Orders





Pravastatin Sodium [Pravachol -] 80 mg PO HS 06/30/13 


metFORMIN HCL [Glucophage] 1,000 mg PO DAILY 06/30/13 


Aspirin [ASA -] 81 mg PO DAILY #0 tab.chew 07/05/13 


Levothyroxine [Synthroid -] 112 mcg PO DAILY@0700 #0 tablet 07/05/13 


Losartan Potassium [Cozaar -] 100 mg PO DAILY 09/17/13 


Amlodipine Besylate [Norvasc -] 5 mg PO DAILY 06/13/15 


Insulin Detemir [Levemir Flextouch] 16 unit SQ DAILY #1 ml 01/02/17 


Insulin Lispro [Humalog] 5 unit SQ TID 11/02/18 


Metformin HCl [Metformin HCl ER] 500 mg PO DAILY 11/02/18 


Propylene Glycol/Peg 400/Pf [Systane 0.3-0.4% Eye Drops] 1 each OU TID 11/04/18 


Sitagliptin Phosphate [Januvia] 100 mg PO DAILY 11/04/18 








This patient is new to me today: No


Emergency Visit: Yes


ED Registration Date: 11/02/18


Care time: The patient presented to the Emergency Department on the above date 

and was hospitalized for further evaluation of their emergent condition.


Critical Care patient: No





- Discharge Referral


Referred to [facility] No

## 2018-11-15 ENCOUNTER — HOSPITAL ENCOUNTER (EMERGENCY)
Dept: HOSPITAL 74 - JER | Age: 78
LOS: 1 days | Discharge: HOME | End: 2018-11-16
Payer: COMMERCIAL

## 2018-11-15 VITALS — DIASTOLIC BLOOD PRESSURE: 58 MMHG | HEART RATE: 80 BPM | SYSTOLIC BLOOD PRESSURE: 131 MMHG

## 2018-11-15 VITALS — BODY MASS INDEX: 23.6 KG/M2

## 2018-11-15 VITALS — TEMPERATURE: 97.7 F

## 2018-11-15 DIAGNOSIS — E78.00: ICD-10-CM

## 2018-11-15 DIAGNOSIS — E11.9: ICD-10-CM

## 2018-11-15 DIAGNOSIS — Z79.4: ICD-10-CM

## 2018-11-15 DIAGNOSIS — Z79.84: ICD-10-CM

## 2018-11-15 DIAGNOSIS — K65.1: Primary | ICD-10-CM

## 2018-11-15 DIAGNOSIS — I10: ICD-10-CM

## 2018-11-15 DIAGNOSIS — E03.9: ICD-10-CM

## 2018-11-15 LAB
ALBUMIN SERPL-MCNC: 3.6 G/DL (ref 3.4–5)
ALP SERPL-CCNC: 118 U/L (ref 45–117)
ALT SERPL-CCNC: 25 U/L (ref 13–61)
ANION GAP SERPL CALC-SCNC: 9 MMOL/L (ref 8–16)
AST SERPL-CCNC: 19 U/L (ref 15–37)
BASOPHILS # BLD: 1.2 % (ref 0–2)
BILIRUB SERPL-MCNC: 0.3 MG/DL (ref 0.2–1)
BUN SERPL-MCNC: 12 MG/DL (ref 7–18)
CALCIUM SERPL-MCNC: 9.4 MG/DL (ref 8.5–10.1)
CHLORIDE SERPL-SCNC: 97 MMOL/L (ref 98–107)
CO2 SERPL-SCNC: 28 MMOL/L (ref 21–32)
CREAT SERPL-MCNC: 0.8 MG/DL (ref 0.55–1.3)
DEPRECATED RDW RBC AUTO: 12.9 % (ref 11.6–15.6)
EOSINOPHIL # BLD: 1.4 % (ref 0–4.5)
GLUCOSE SERPL-MCNC: 208 MG/DL (ref 74–106)
HCT VFR BLD CALC: 39.7 % (ref 32.4–45.2)
HGB BLD-MCNC: 13.7 GM/DL (ref 10.7–15.3)
LYMPHOCYTES # BLD: 16.6 % (ref 8–40)
MCH RBC QN AUTO: 31.2 PG (ref 25.7–33.7)
MCHC RBC AUTO-ENTMCNC: 34.5 G/DL (ref 32–36)
MCV RBC: 90.6 FL (ref 80–96)
MONOCYTES # BLD AUTO: 11.6 % (ref 3.8–10.2)
NEUTROPHILS # BLD: 69.2 % (ref 42.8–82.8)
PLATELET # BLD AUTO: 314 K/MM3 (ref 134–434)
PMV BLD: 8.6 FL (ref 7.5–11.1)
POTASSIUM SERPLBLD-SCNC: 4.7 MMOL/L (ref 3.5–5.1)
PROT SERPL-MCNC: 7.3 G/DL (ref 6.4–8.2)
RBC # BLD AUTO: 4.38 M/MM3 (ref 3.6–5.2)
SODIUM SERPL-SCNC: 133 MMOL/L (ref 136–145)
WBC # BLD AUTO: 7.2 K/MM3 (ref 4–10)

## 2018-11-15 NOTE — PDOC
History of Present Illness





- General


Chief Complaint: Abscess Boil


Stated Complaint: ABSCESS BOIL


Time Seen by Provider: 11/15/18 12:55





Past History





- Past Medical History


Allergies/Adverse Reactions: 


 Allergies











Allergy/AdvReac Type Severity Reaction Status Date / Time


 


No Known Allergies Allergy   Verified 11/15/18 12:14











Home Medications: 


Ambulatory Orders





Pravastatin Sodium [Pravachol -] 80 mg PO HS 06/30/13 


metFORMIN HCL [Glucophage] 1,000 mg PO DAILY 06/30/13 


Aspirin [ASA -] 81 mg PO DAILY #0 tab.chew 07/05/13 


Levothyroxine [Synthroid -] 112 mcg PO DAILY@0700 #0 tablet 07/05/13 


Losartan Potassium [Cozaar -] 100 mg PO DAILY 09/17/13 


Amlodipine Besylate [Norvasc -] 5 mg PO DAILY 06/13/15 


Insulin Detemir [Levemir Flextouch] 16 unit SQ DAILY #1 ml 01/02/17 


Insulin Lispro [Humalog] 5 unit SQ TID 11/02/18 


Metformin HCl [Metformin HCl ER] 500 mg PO DAILY 11/02/18 


Propylene Glycol/Peg 400/Pf [Systane 0.3-0.4% Eye Drop] 1 each OU TID 11/04/18 


Sitagliptin Phosphate [Januvia] 100 mg PO DAILY 11/04/18 


Amoxicillin/Potassium Clav [Augmentin 875-125 Tablet] 1 each PO BID #20 tablet 

11/09/18 


Docusate Sodium [Colace -] 100 mg PO TID #90 capsule 11/09/18 


Lactobacillus Acidophilus [Bacid -] 1 tab PO DAILY #30 tab 11/09/18 


Melatonin 5 mg PO HS PRN #30 tab 11/09/18 


Polyethylene Glycol 3350 [Miralax 119 gm Btl -] 17 gm PO BID #1 bottle 11/09/18 








Anemia: No


Asthma: No


Cancer: No


Cardiac Disorders: No


CVA: No


COPD: No


CHF: No


Dementia: No


Diabetes: Yes


GI Disorders: Yes (BLOOD IN STOOL)


 Disorders: No


HTN: Yes


Hypercholesterolemia: Yes


Liver Disease: No


Seizures: No


Thyroid Disease: Yes





- Surgical History


Abdominal Surgery: No


Appendectomy: No


Cardiac Surgery: No


Cholecystectomy: No


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


Orthopedic Surgery: Yes (L. Foot, L. Knee replaced)





- Suicide/Smoking/Psychosocial Hx


Smoking Status: No


Smoking History: Never smoked


Have you smoked in the past 12 months: No


Number of Cigarettes Smoked Daily: 0


Hx Alcohol Use: No


Drug/Substance Use Hx: No


Substance Use Type: None


Hx Substance Use Treatment: No





*Physical Exam





- Vital Signs


 Last Vital Signs











Temp Pulse Resp BP Pulse Ox


 


 97.7 F   64   18   148/66   99 


 


 11/15/18 12:12  11/15/18 12:12  11/15/18 12:12  11/15/18 12:12  11/15/18 12:12














ED Treatment Course





- LABORATORY


CBC & Chemistry Diagram: 


 11/15/18 14:10





 11/15/18 14:10





*DC/Admit/Observation/Transfer


Diagnosis at time of Disposition: 


 Intra-abdominal abscess








- Discharge Dispostion


Disposition: HOME


Condition at time of disposition: Stable


Decision to Admit order: No





- Referrals


Referrals: 


Yaya Blair MD [Primary Care Provider] - 





- Patient Instructions


Printed Discharge Instructions:  DI for Intra-Abdominal Abscess


Additional Instructions: 


Your lab and imaging results showed your abdominal abscess has slightly 

improved. Continue taking the Augmentin as previously prescribed. 





Follow up with your primary care physician as previously scheduled or after 

finishing your antibiotics. You will need to call to make an appointment. Take 

your home medications as instructed. No change was made in your medications 

today.





Return if worsening symptoms including fevers, headache, vomiting, visual or 

hearing disturbances, abdominal pain, chest pain, shortness of breath, pass out

, dehydration, inability to take things by mouth, altered mental status, or 

worsening concerning symptoms. 





Go to the nearest emergency department if your condition worsens or you feel 

like you need additional emergency evaluation. 





Print Language: ENGLISH





- Post Discharge Activity

## 2021-10-24 ENCOUNTER — HOSPITAL ENCOUNTER (INPATIENT)
Dept: HOSPITAL 74 - JER | Age: 81
LOS: 3 days | Discharge: NURSING, REHAB OR LONG-TERM CARE | DRG: 536 | End: 2021-10-27
Attending: GENERAL ACUTE CARE HOSPITAL | Admitting: INTERNAL MEDICINE
Payer: COMMERCIAL

## 2021-10-24 VITALS — BODY MASS INDEX: 23.6 KG/M2

## 2021-10-24 DIAGNOSIS — E11.9: ICD-10-CM
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
## 2021-10-24 DIAGNOSIS — G47.00: ICD-10-CM

## 2021-10-24 DIAGNOSIS — R91.1: ICD-10-CM

## 2021-10-24 DIAGNOSIS — S32.512A: Primary | ICD-10-CM

## 2021-10-24 DIAGNOSIS — E78.5: ICD-10-CM
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
## 2021-10-24 DIAGNOSIS — N39.0: ICD-10-CM

## 2021-10-24 DIAGNOSIS — S32.592A: ICD-10-CM
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
## 2021-10-24 DIAGNOSIS — B96.20: ICD-10-CM

## 2021-10-24 DIAGNOSIS — E03.9: ICD-10-CM

## 2021-10-24 DIAGNOSIS — I10: ICD-10-CM

## 2021-10-24 LAB
ALBUMIN SERPL-MCNC: 3.5 G/DL (ref 3.4–5)
ALP SERPL-CCNC: 84 U/L (ref 45–117)
ALT SERPL-CCNC: 32 U/L (ref 13–61)
ANION GAP SERPL CALC-SCNC: 5 MMOL/L (ref 8–16)
APPEARANCE UR: CLEAR
APTT BLD: 27.4 SECONDS (ref 25.2–36.5)
AST SERPL-CCNC: 26 U/L (ref 15–37)
BACTERIA # UR AUTO: 7224 /UL (ref 0–1359)
BASOPHILS # BLD: 0.3 % (ref 0–2)
BILIRUB SERPL-MCNC: 0.4 MG/DL (ref 0.2–1)
BILIRUB UR STRIP.AUTO-MCNC: NEGATIVE MG/DL
BUN SERPL-MCNC: 19.6 MG/DL (ref 7–18)
CALCIUM SERPL-MCNC: 9.3 MG/DL (ref 8.5–10.1)
CASTS URNS QL MICRO: 2 /UL (ref 0–3.1)
CHLORIDE SERPL-SCNC: 105 MMOL/L (ref 98–107)
CO2 SERPL-SCNC: 28 MMOL/L (ref 21–32)
COLOR UR: (no result)
CREAT SERPL-MCNC: 0.8 MG/DL (ref 0.55–1.3)
DEPRECATED RDW RBC AUTO: 12.5 % (ref 11.6–15.6)
EOSINOPHIL # BLD: 0.3 % (ref 0–4.5)
EPITH CASTS URNS QL MICRO: 0 /UL (ref 0–25.1)
GLUCOSE SERPL-MCNC: 94 MG/DL (ref 74–106)
HCT VFR BLD CALC: 39.6 % (ref 32.4–45.2)
HGB BLD-MCNC: 13.6 GM/DL (ref 10.7–15.3)
INR BLD: 0.96 (ref 0.83–1.09)
KETONES UR QL STRIP: (no result)
LEUKOCYTE ESTERASE UR QL STRIP.AUTO: (no result)
LYMPHOCYTES # BLD: 5.7 % (ref 8–40)
MCH RBC QN AUTO: 30.9 PG (ref 25.7–33.7)
MCHC RBC AUTO-ENTMCNC: 34.3 G/DL (ref 32–36)
MCV RBC: 90 FL (ref 80–96)
MONOCYTES # BLD AUTO: 7.1 % (ref 3.8–10.2)
NEUTROPHILS # BLD: 86.6 % (ref 42.8–82.8)
NITRITE UR QL STRIP: NEGATIVE
PH UR: 6.5 [PH] (ref 5–8)
PLATELET # BLD AUTO: 164 10^3/UL (ref 134–434)
PMV BLD: 8.6 FL (ref 7.5–11.1)
PROT SERPL-MCNC: 6.7 G/DL (ref 6.4–8.2)
PROT UR QL STRIP: (no result)
PROT UR QL STRIP: NEGATIVE
PT PNL PPP: 11.2 SEC (ref 9.7–13)
RBC # BLD AUTO: 4 /UL (ref 0–23.9)
RBC # BLD AUTO: 4.4 M/MM3 (ref 3.6–5.2)
SODIUM SERPL-SCNC: 138 MMOL/L (ref 136–145)
SP GR UR: 1.01 (ref 1.01–1.03)
UROBILINOGEN UR STRIP-MCNC: 0.2 MG/DL (ref 0.2–1)
WBC # BLD AUTO: 14.5 K/MM3 (ref 4–10)
WBC # UR AUTO: 162 /UL (ref 0–25.8)

## 2021-10-24 PROCEDURE — C9803 HOPD COVID-19 SPEC COLLECT: HCPCS

## 2021-10-24 PROCEDURE — U0003 INFECTIOUS AGENT DETECTION BY NUCLEIC ACID (DNA OR RNA); SEVERE ACUTE RESPIRATORY SYNDROME CORONAVIRUS 2 (SARS-COV-2) (CORONAVIRUS DISEASE [COVID-19]), AMPLIFIED PROBE TECHNIQUE, MAKING USE OF HIGH THROUGHPUT TECHNOLOGIES AS DESCRIBED BY CMS-2020-01-R: HCPCS

## 2021-10-24 PROCEDURE — U0005 INFEC AGEN DETEC AMPLI PROBE: HCPCS

## 2021-10-24 RX ADMIN — DOCUSATE SODIUM SCH MG: 100 CAPSULE, LIQUID FILLED ORAL at 22:54

## 2021-10-24 RX ADMIN — CEFTRIAXONE SCH MLS/HR: 1 INJECTION, POWDER, FOR SOLUTION INTRAMUSCULAR; INTRAVENOUS at 21:21

## 2021-10-24 RX ADMIN — ACETAMINOPHEN PRN MG: 10 INJECTION, SOLUTION INTRAVENOUS at 21:32

## 2021-10-24 RX ADMIN — HEPARIN SODIUM SCH UNIT: 5000 INJECTION, SOLUTION INTRAVENOUS; SUBCUTANEOUS at 22:55

## 2021-10-24 RX ADMIN — ATORVASTATIN CALCIUM SCH MG: 20 TABLET, FILM COATED ORAL at 22:55

## 2021-10-25 LAB
ALBUMIN SERPL-MCNC: 3.1 G/DL (ref 3.4–5)
ALP SERPL-CCNC: 67 U/L (ref 45–117)
ALT SERPL-CCNC: 25 U/L (ref 13–61)
ANION GAP SERPL CALC-SCNC: 7 MMOL/L (ref 8–16)
AST SERPL-CCNC: 17 U/L (ref 15–37)
BASOPHILS # BLD: 0.2 % (ref 0–2)
BILIRUB SERPL-MCNC: 0.6 MG/DL (ref 0.2–1)
BUN SERPL-MCNC: 22.6 MG/DL (ref 7–18)
CALCIUM SERPL-MCNC: 9.2 MG/DL (ref 8.5–10.1)
CHLORIDE SERPL-SCNC: 102 MMOL/L (ref 98–107)
CO2 SERPL-SCNC: 26 MMOL/L (ref 21–32)
CREAT SERPL-MCNC: 0.9 MG/DL (ref 0.55–1.3)
DEPRECATED RDW RBC AUTO: 12.4 % (ref 11.6–15.6)
EOSINOPHIL # BLD: 0.2 % (ref 0–4.5)
GLUCOSE SERPL-MCNC: 245 MG/DL (ref 74–106)
HCT VFR BLD CALC: 36.5 % (ref 32.4–45.2)
HGB BLD-MCNC: 12.5 GM/DL (ref 10.7–15.3)
LYMPHOCYTES # BLD: 4.7 % (ref 8–40)
MCH RBC QN AUTO: 30.7 PG (ref 25.7–33.7)
MCHC RBC AUTO-ENTMCNC: 34.1 G/DL (ref 32–36)
MCV RBC: 90.2 FL (ref 80–96)
MONOCYTES # BLD AUTO: 4.9 % (ref 3.8–10.2)
NEUTROPHILS # BLD: 90 % (ref 42.8–82.8)
PLATELET # BLD AUTO: 145 10^3/UL (ref 134–434)
PMV BLD: 9 FL (ref 7.5–11.1)
PROT SERPL-MCNC: 6.1 G/DL (ref 6.4–8.2)
RBC # BLD AUTO: 4.05 M/MM3 (ref 3.6–5.2)
SODIUM SERPL-SCNC: 135 MMOL/L (ref 136–145)
WBC # BLD AUTO: 12 K/MM3 (ref 4–10)

## 2021-10-25 RX ADMIN — DOCUSATE SODIUM SCH MG: 100 CAPSULE, LIQUID FILLED ORAL at 15:06

## 2021-10-25 RX ADMIN — HEPARIN SODIUM SCH UNIT: 5000 INJECTION, SOLUTION INTRAVENOUS; SUBCUTANEOUS at 09:49

## 2021-10-25 RX ADMIN — DOCUSATE SODIUM SCH MG: 100 CAPSULE, LIQUID FILLED ORAL at 21:50

## 2021-10-25 RX ADMIN — AMLODIPINE BESYLATE SCH MG: 5 TABLET ORAL at 09:49

## 2021-10-25 RX ADMIN — HEPARIN SODIUM SCH UNIT: 5000 INJECTION, SOLUTION INTRAVENOUS; SUBCUTANEOUS at 21:50

## 2021-10-25 RX ADMIN — ACETAMINOPHEN PRN MG: 10 INJECTION, SOLUTION INTRAVENOUS at 06:29

## 2021-10-25 RX ADMIN — INSULIN DETEMIR SCH UNITS: 100 INJECTION, SOLUTION SUBCUTANEOUS at 09:48

## 2021-10-25 RX ADMIN — LOSARTAN POTASSIUM AND HYDROCHLOROTHIAZIDE TABLETS SCH TAB: 50; 12.5 TABLET, FILM COATED ORAL at 23:03

## 2021-10-25 RX ADMIN — DOCUSATE SODIUM SCH: 100 CAPSULE, LIQUID FILLED ORAL at 06:38

## 2021-10-25 RX ADMIN — INSULIN ASPART SCH UNITS: 100 INJECTION, SOLUTION INTRAVENOUS; SUBCUTANEOUS at 06:33

## 2021-10-25 RX ADMIN — DOCUSATE SODIUM SCH MG: 100 CAPSULE, LIQUID FILLED ORAL at 06:45

## 2021-10-25 RX ADMIN — INSULIN ASPART SCH UNITS: 100 INJECTION, SOLUTION INTRAVENOUS; SUBCUTANEOUS at 16:53

## 2021-10-25 RX ADMIN — INSULIN DETEMIR SCH UNITS: 100 INJECTION, SOLUTION SUBCUTANEOUS at 21:50

## 2021-10-25 RX ADMIN — INSULIN ASPART SCH UNITS: 100 INJECTION, SOLUTION INTRAVENOUS; SUBCUTANEOUS at 10:51

## 2021-10-25 RX ADMIN — ATORVASTATIN CALCIUM SCH MG: 20 TABLET, FILM COATED ORAL at 21:50

## 2021-10-25 RX ADMIN — LEVOTHYROXINE SODIUM SCH MCG: 112 TABLET ORAL at 06:44

## 2021-10-25 RX ADMIN — CEFTRIAXONE SCH MLS/HR: 1 INJECTION, POWDER, FOR SOLUTION INTRAMUSCULAR; INTRAVENOUS at 09:49

## 2021-10-26 LAB
ALBUMIN SERPL-MCNC: 2.6 G/DL (ref 3.4–5)
ALP SERPL-CCNC: 70 U/L (ref 45–117)
ALT SERPL-CCNC: 22 U/L (ref 13–61)
ANION GAP SERPL CALC-SCNC: 9 MMOL/L (ref 8–16)
AST SERPL-CCNC: 15 U/L (ref 15–37)
BASOPHILS # BLD: 0.5 % (ref 0–2)
BILIRUB SERPL-MCNC: 0.6 MG/DL (ref 0.2–1)
BUN SERPL-MCNC: 24 MG/DL (ref 7–18)
CALCIUM SERPL-MCNC: 8.6 MG/DL (ref 8.5–10.1)
CHLORIDE SERPL-SCNC: 102 MMOL/L (ref 98–107)
CO2 SERPL-SCNC: 25 MMOL/L (ref 21–32)
CREAT SERPL-MCNC: 1 MG/DL (ref 0.55–1.3)
DEPRECATED RDW RBC AUTO: 12.6 % (ref 11.6–15.6)
EOSINOPHIL # BLD: 1.6 % (ref 0–4.5)
GLUCOSE SERPL-MCNC: 241 MG/DL (ref 74–106)
HCT VFR BLD CALC: 34.6 % (ref 32.4–45.2)
HGB BLD-MCNC: 11.9 GM/DL (ref 10.7–15.3)
LYMPHOCYTES # BLD: 10.7 % (ref 8–40)
MAGNESIUM SERPL-MCNC: 1.8 MG/DL (ref 1.8–2.4)
MCH RBC QN AUTO: 31.5 PG (ref 25.7–33.7)
MCHC RBC AUTO-ENTMCNC: 34.5 G/DL (ref 32–36)
MCV RBC: 91.3 FL (ref 80–96)
MONOCYTES # BLD AUTO: 8.4 % (ref 3.8–10.2)
NEUTROPHILS # BLD: 78.8 % (ref 42.8–82.8)
PHOSPHATE SERPL-MCNC: 2.9 MG/DL (ref 2.5–4.9)
PLATELET # BLD AUTO: 139 10^3/UL (ref 134–434)
PMV BLD: 9 FL (ref 7.5–11.1)
PROT SERPL-MCNC: 6 G/DL (ref 6.4–8.2)
RBC # BLD AUTO: 3.78 M/MM3 (ref 3.6–5.2)
SODIUM SERPL-SCNC: 136 MMOL/L (ref 136–145)
WBC # BLD AUTO: 8.9 K/MM3 (ref 4–10)

## 2021-10-26 RX ADMIN — AMLODIPINE BESYLATE SCH MG: 5 TABLET ORAL at 10:06

## 2021-10-26 RX ADMIN — LEVOTHYROXINE SODIUM SCH MCG: 112 TABLET ORAL at 06:30

## 2021-10-26 RX ADMIN — DOCUSATE SODIUM SCH MG: 100 CAPSULE, LIQUID FILLED ORAL at 14:20

## 2021-10-26 RX ADMIN — INSULIN ASPART SCH UNITS: 100 INJECTION, SOLUTION INTRAVENOUS; SUBCUTANEOUS at 17:04

## 2021-10-26 RX ADMIN — CEFTRIAXONE SCH MLS/HR: 1 INJECTION, POWDER, FOR SOLUTION INTRAMUSCULAR; INTRAVENOUS at 10:05

## 2021-10-26 RX ADMIN — ATORVASTATIN CALCIUM SCH MG: 20 TABLET, FILM COATED ORAL at 21:33

## 2021-10-26 RX ADMIN — DOCUSATE SODIUM SCH MG: 100 CAPSULE, LIQUID FILLED ORAL at 06:29

## 2021-10-26 RX ADMIN — ACETAMINOPHEN PRN MG: 10 INJECTION, SOLUTION INTRAVENOUS at 17:55

## 2021-10-26 RX ADMIN — INSULIN ASPART SCH UNITS: 100 INJECTION, SOLUTION INTRAVENOUS; SUBCUTANEOUS at 11:41

## 2021-10-26 RX ADMIN — HEPARIN SODIUM SCH UNIT: 5000 INJECTION, SOLUTION INTRAVENOUS; SUBCUTANEOUS at 10:06

## 2021-10-26 RX ADMIN — INSULIN ASPART SCH UNITS: 100 INJECTION, SOLUTION INTRAVENOUS; SUBCUTANEOUS at 06:32

## 2021-10-26 RX ADMIN — INSULIN DETEMIR SCH UNITS: 100 INJECTION, SOLUTION SUBCUTANEOUS at 21:33

## 2021-10-26 RX ADMIN — HEPARIN SODIUM SCH UNIT: 5000 INJECTION, SOLUTION INTRAVENOUS; SUBCUTANEOUS at 21:33

## 2021-10-26 RX ADMIN — ACETAMINOPHEN PRN MG: 10 INJECTION, SOLUTION INTRAVENOUS at 11:39

## 2021-10-26 RX ADMIN — LOSARTAN POTASSIUM AND HYDROCHLOROTHIAZIDE TABLETS SCH TAB: 50; 12.5 TABLET, FILM COATED ORAL at 10:10

## 2021-10-26 RX ADMIN — LOSARTAN POTASSIUM AND HYDROCHLOROTHIAZIDE TABLETS SCH TAB: 50; 12.5 TABLET, FILM COATED ORAL at 21:33

## 2021-10-26 RX ADMIN — DOCUSATE SODIUM SCH MG: 100 CAPSULE, LIQUID FILLED ORAL at 21:33

## 2021-10-26 RX ADMIN — INSULIN DETEMIR SCH UNITS: 100 INJECTION, SOLUTION SUBCUTANEOUS at 06:30

## 2021-10-27 VITALS — TEMPERATURE: 98 F | HEART RATE: 77 BPM | SYSTOLIC BLOOD PRESSURE: 134 MMHG | DIASTOLIC BLOOD PRESSURE: 67 MMHG

## 2021-10-27 RX ADMIN — INSULIN DETEMIR SCH UNITS: 100 INJECTION, SOLUTION SUBCUTANEOUS at 06:10

## 2021-10-27 RX ADMIN — HEPARIN SODIUM SCH UNIT: 5000 INJECTION, SOLUTION INTRAVENOUS; SUBCUTANEOUS at 09:48

## 2021-10-27 RX ADMIN — INSULIN ASPART SCH UNITS: 100 INJECTION, SOLUTION INTRAVENOUS; SUBCUTANEOUS at 11:53

## 2021-10-27 RX ADMIN — INSULIN ASPART SCH UNITS: 100 INJECTION, SOLUTION INTRAVENOUS; SUBCUTANEOUS at 16:32

## 2021-10-27 RX ADMIN — DOCUSATE SODIUM SCH MG: 100 CAPSULE, LIQUID FILLED ORAL at 06:09

## 2021-10-27 RX ADMIN — CEFTRIAXONE SCH MLS/HR: 1 INJECTION, POWDER, FOR SOLUTION INTRAMUSCULAR; INTRAVENOUS at 09:50

## 2021-10-27 RX ADMIN — LOSARTAN POTASSIUM AND HYDROCHLOROTHIAZIDE TABLETS SCH TAB: 50; 12.5 TABLET, FILM COATED ORAL at 09:47

## 2021-10-27 RX ADMIN — ACETAMINOPHEN PRN MG: 10 INJECTION, SOLUTION INTRAVENOUS at 09:43

## 2021-10-27 RX ADMIN — ACETAMINOPHEN PRN MG: 10 INJECTION, SOLUTION INTRAVENOUS at 03:25

## 2021-10-27 RX ADMIN — LEVOTHYROXINE SODIUM SCH MCG: 112 TABLET ORAL at 06:09

## 2021-10-27 RX ADMIN — DOCUSATE SODIUM SCH MG: 100 CAPSULE, LIQUID FILLED ORAL at 14:04

## 2021-10-27 RX ADMIN — AMLODIPINE BESYLATE SCH MG: 5 TABLET ORAL at 09:46

## 2021-10-27 RX ADMIN — INSULIN ASPART SCH UNITS: 100 INJECTION, SOLUTION INTRAVENOUS; SUBCUTANEOUS at 06:09
